# Patient Record
Sex: FEMALE | Race: WHITE
[De-identification: names, ages, dates, MRNs, and addresses within clinical notes are randomized per-mention and may not be internally consistent; named-entity substitution may affect disease eponyms.]

---

## 2017-04-05 ENCOUNTER — HOSPITAL ENCOUNTER (OUTPATIENT)
Dept: HOSPITAL 53 - M WHC | Age: 82
End: 2017-04-05
Attending: INTERNAL MEDICINE
Payer: MEDICARE
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
## 2017-04-05 DIAGNOSIS — S22.000D: ICD-10-CM
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
## 2017-04-05 DIAGNOSIS — Z12.31: Primary | ICD-10-CM

## 2017-04-05 DIAGNOSIS — Z78.0: ICD-10-CM

## 2017-04-05 PROCEDURE — 77080 DXA BONE DENSITY AXIAL: CPT

## 2017-04-05 NOTE — REPMRS
Patient History

The patient states she had a clinical breast exam in 12/16

Patient is postmenopausal.

Family history of breast cancer in maternal aunt at age 50 or 

over.

 

Digital Woman Screen Mammo: April 5, 2017 - Exam #: 

[id number]

Bilateral CC and MLO view(s) were taken.

 

Technologist: Sarah Feng, Technologist

Prior study comparison: Raya 10, 2015, digital woman screen mammo

performed at [facility].  May 27, 2014, left 

breast digital mammo diagnostic unilateral, performed at 

[facility].  May 20, 2014, digital woman screen 

mammo performed at [facility].  May 17, 2013, 

digital woman screen mammo performed at [facility].

 

FINDINGS: There are scattered fibroglandular densities.  There 

has been no change in the appearance of the mammogram from the 

prior studies.  There is a mild amount of scattered 

fibroglandular density which is fairly symmetric. There is no 

interval development of dominant mass, architectural distortion, 

or clustered microcalcification suggestive of malignancy.

 

ASSESSMENT: BI-RADS/ACR category 1 mammogram. Negative.

 

Recommendation

Routine screening mammogram in 1 year (for women over age 40).

This mammogram was interpreted with the aid of an FDA-approved 

computer-aided dectection system.

 

Electronically Signed By: Carrington Guardado MD 04/05/17 2020

## 2017-04-07 NOTE — DEXA
AP SPINE   L1 - L4   1.203    0.1      0.8

LT FEMUR   TOTAL   0.935   -0.6      0.8

RT FEMUR   TOTAL   0.902   -0.8      0.5

TOTAL BODY   TOTAL

OTHER



DUAL FEMUR FRAX* ASSESSMENT

Risk factors:      Alcohol (3 or more units per day). History of fracture (adult
). Secondary osteoporosis (premature menopause).

10 year probability of fracture

Major osteoporotic fracture            22.8 %

Hip fracture                 6.9 %



COMMENTS:

Normal bone densitometry of the spine.

Normal bone densitometry of the right hip.

There is low bone density of the left hip.

Lumbar scoliosis.

The density of the spine has increased 14.5% since the initial exam on 08/28/
2005.

The spine density has increased 12.4% since the most recent exam on 04/27/2011.

The density of the left hip has increased 6.4% since initial exam on 08/28/2005.

The density of the left hip has increased 4.4% since the most recent exam on 04/
27/2011.

The density of the right hip has increased 0.8% since the initial exam on 08/28/
2005.

The density of the right hip has decreased 0.3% since the most recent exam on 04
/22/2011.



FOLLOW-UP:

Recommendation for the next bone density exam: 2 years.
MARGARITA

## 2017-05-08 ENCOUNTER — HOSPITAL ENCOUNTER (OUTPATIENT)
Dept: HOSPITAL 53 - M OPP | Age: 82
Discharge: HOME | End: 2017-05-08
Attending: INTERNAL MEDICINE
Payer: MEDICARE

## 2017-05-08 VITALS — HEIGHT: 66 IN | BODY MASS INDEX: 35.36 KG/M2 | WEIGHT: 220 LBS

## 2017-05-08 VITALS — SYSTOLIC BLOOD PRESSURE: 151 MMHG | DIASTOLIC BLOOD PRESSURE: 80 MMHG

## 2017-05-08 DIAGNOSIS — K57.30: ICD-10-CM

## 2017-05-08 DIAGNOSIS — Z12.11: Primary | ICD-10-CM

## 2017-05-08 DIAGNOSIS — Z79.82: ICD-10-CM

## 2017-05-08 DIAGNOSIS — Z91.013: ICD-10-CM

## 2017-05-08 DIAGNOSIS — Z88.0: ICD-10-CM

## 2017-05-08 DIAGNOSIS — K21.9: ICD-10-CM

## 2017-05-08 DIAGNOSIS — Z88.5: ICD-10-CM

## 2017-05-08 DIAGNOSIS — M19.90: ICD-10-CM

## 2017-05-08 DIAGNOSIS — I10: ICD-10-CM

## 2017-05-08 DIAGNOSIS — M41.9: ICD-10-CM

## 2017-05-08 DIAGNOSIS — E78.00: ICD-10-CM

## 2017-05-08 DIAGNOSIS — Z88.2: ICD-10-CM

## 2017-05-08 DIAGNOSIS — K64.8: ICD-10-CM

## 2017-05-08 DIAGNOSIS — M48.00: ICD-10-CM

## 2017-05-08 DIAGNOSIS — K64.4: ICD-10-CM

## 2017-05-08 DIAGNOSIS — Z88.1: ICD-10-CM

## 2017-05-08 DIAGNOSIS — Z86.010: ICD-10-CM

## 2017-05-08 DIAGNOSIS — Z79.899: ICD-10-CM

## 2017-05-08 DIAGNOSIS — K63.5: ICD-10-CM

## 2017-05-08 DIAGNOSIS — R06.83: ICD-10-CM

## 2017-05-08 DIAGNOSIS — Z88.8: ICD-10-CM

## 2017-06-09 ENCOUNTER — HOSPITAL ENCOUNTER (OUTPATIENT)
Dept: HOSPITAL 53 - M SFHCPLAZ | Age: 82
End: 2017-06-09
Attending: INTERNAL MEDICINE
Payer: MEDICARE

## 2017-06-09 DIAGNOSIS — I10: Primary | ICD-10-CM
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
## 2017-06-09 DIAGNOSIS — E78.00: ICD-10-CM
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
## 2017-06-09 DIAGNOSIS — S22.000D: ICD-10-CM
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
## 2017-06-09 LAB
ALBUMIN SERPL BCG-MCNC: 3.6 GM/DL (ref 3.2–5.2)
ALBUMIN/GLOB SERPL: 1.06 {RATIO} (ref 1–1.93)
ALP SERPL-CCNC: 64 U/L (ref 45–117)
ALT SERPL W P-5'-P-CCNC: 33 U/L (ref 12–78)
ANION GAP SERPL CALC-SCNC: 7 MEQ/L (ref 8–16)
AST SERPL-CCNC: 24 U/L (ref 15–37)
BILIRUB SERPL-MCNC: 0.9 MG/DL (ref 0.2–1)
BUN SERPL-MCNC: 19 MG/DL (ref 7–18)
CALCIUM SERPL-MCNC: 9.5 MG/DL (ref 8.8–10.2)
CHLORIDE SERPL-SCNC: 105 MEQ/L (ref 98–107)
CHOLEST SERPL-MCNC: 221 MG/DL (ref ?–200)
CO2 SERPL-SCNC: 29 MEQ/L (ref 21–32)
CREAT SERPL-MCNC: 1.01 MG/DL (ref 0.55–1.02)
GFR SERPL CREATININE-BSD FRML MDRD: 55.7 ML/MIN/{1.73_M2} (ref 32–?)
GLUCOSE SERPL-MCNC: 108 MG/DL (ref 83–110)
MAGNESIUM SERPL-MCNC: 2 MG/DL (ref 1.8–2.4)
POTASSIUM SERPL-SCNC: 3.6 MEQ/L (ref 3.5–5.1)
PROT SERPL-MCNC: 7 GM/DL (ref 6.4–8.2)
SODIUM SERPL-SCNC: 141 MEQ/L (ref 136–145)
TRIGL SERPL-MCNC: 263 MG/DL (ref ?–150)

## 2017-12-11 ENCOUNTER — HOSPITAL ENCOUNTER (OUTPATIENT)
Dept: HOSPITAL 53 - M SFHCPLAZ | Age: 82
End: 2017-12-11
Attending: INTERNAL MEDICINE
Payer: MEDICARE

## 2017-12-11 DIAGNOSIS — E78.00: Primary | ICD-10-CM

## 2017-12-11 DIAGNOSIS — I10: ICD-10-CM

## 2017-12-11 DIAGNOSIS — K21.9: ICD-10-CM

## 2017-12-11 LAB
ALBUMIN SERPL BCG-MCNC: 3.8 GM/DL (ref 3.2–5.2)
ALBUMIN/GLOB SERPL: 1.12 {RATIO} (ref 1–1.93)
ALP SERPL-CCNC: 74 U/L (ref 45–117)
ALT SERPL W P-5'-P-CCNC: 27 U/L (ref 12–78)
ANION GAP SERPL CALC-SCNC: 9 MEQ/L (ref 8–16)
AST SERPL-CCNC: 23 U/L (ref 7–37)
BILIRUB SERPL-MCNC: 1 MG/DL (ref 0.2–1)
BUN SERPL-MCNC: 15 MG/DL (ref 7–18)
CALCIUM SERPL-MCNC: 9.4 MG/DL (ref 8.8–10.2)
CHLORIDE SERPL-SCNC: 105 MEQ/L (ref 98–107)
CHOLEST SERPL-MCNC: 198 MG/DL (ref ?–200)
CO2 SERPL-SCNC: 28 MEQ/L (ref 21–32)
CREAT SERPL-MCNC: 0.85 MG/DL (ref 0.55–1.02)
ERYTHROCYTE [DISTWIDTH] IN BLOOD BY AUTOMATED COUNT: 13 % (ref 11.5–14.5)
GFR SERPL CREATININE-BSD FRML MDRD: > 60 ML/MIN/{1.73_M2} (ref 32–?)
GLUCOSE SERPL-MCNC: 109 MG/DL (ref 83–110)
MAGNESIUM SERPL-MCNC: 2.2 MG/DL (ref 1.8–2.4)
MCH RBC QN AUTO: 32.5 PG (ref 27–33)
MCHC RBC AUTO-ENTMCNC: 33.4 G/DL (ref 32–36.5)
MCV RBC AUTO: 97.2 FL (ref 80–96)
NRBC BLD AUTO-RTO: 0 % (ref 0–0)
PLATELET # BLD AUTO: 241 10^3/UL (ref 150–450)
POTASSIUM SERPL-SCNC: 3.7 MEQ/L (ref 3.5–5.1)
PROT SERPL-MCNC: 7.2 GM/DL (ref 6.4–8.2)
SODIUM SERPL-SCNC: 142 MEQ/L (ref 136–145)
TRIGL SERPL-MCNC: 270 MG/DL (ref ?–150)
WBC # BLD AUTO: 9.1 10^3/UL (ref 4–10)

## 2018-01-14 ENCOUNTER — HOSPITAL ENCOUNTER (EMERGENCY)
Dept: HOSPITAL 53 - M ED | Age: 83
Discharge: HOME | End: 2018-01-14
Payer: MEDICARE

## 2018-01-14 DIAGNOSIS — Z79.82: ICD-10-CM

## 2018-01-14 DIAGNOSIS — R07.89: Primary | ICD-10-CM

## 2018-01-14 DIAGNOSIS — Z88.5: ICD-10-CM

## 2018-01-14 DIAGNOSIS — Z91.013: ICD-10-CM

## 2018-01-14 DIAGNOSIS — Z88.2: ICD-10-CM

## 2018-01-14 DIAGNOSIS — Z88.1: ICD-10-CM

## 2018-01-14 DIAGNOSIS — Z98.890: ICD-10-CM

## 2018-01-14 DIAGNOSIS — Z79.899: ICD-10-CM

## 2018-01-14 DIAGNOSIS — R06.89: ICD-10-CM

## 2018-01-14 DIAGNOSIS — I10: ICD-10-CM

## 2018-01-14 DIAGNOSIS — K21.9: ICD-10-CM

## 2018-01-14 DIAGNOSIS — Z88.0: ICD-10-CM

## 2018-01-14 DIAGNOSIS — Z88.8: ICD-10-CM

## 2018-01-14 DIAGNOSIS — E78.5: ICD-10-CM

## 2018-01-14 LAB
ANION GAP: 8 MEQ/L (ref 8–16)
BASO #: 0.1 10^3/UL (ref 0–0.2)
BASO %: 0.6 % (ref 0–1)
BLOOD UREA NITROGEN: 18 MG/DL (ref 7–18)
CALCIUM LEVEL: 9.1 MG/DL (ref 8.8–10.2)
CARBON DIOXIDE LEVEL: 27 MEQ/L (ref 21–32)
CHLORIDE LEVEL: 107 MEQ/L (ref 98–107)
CK MB CFR.DF SERPL CALC: 2.42
CK MB CFR.DF SERPL CALC: 2.44
CK SERPL-CCNC: 107 U/L (ref 26–192)
CK SERPL-CCNC: 98 U/L (ref 26–192)
CK-MB VALUE MASS: 2.4 NG/ML (ref 0–3.6)
CK-MB VALUE MASS: 2.6 NG/ML (ref 0–3.6)
CREATININE FOR GFR: 1.13 MG/DL (ref 0.55–1.02)
EOS #: 0.1 10^3/UL (ref 0–0.5)
EOSINOPHIL NFR BLD AUTO: 1 % (ref 0–3)
GFR SERPL CREATININE-BSD FRML MDRD: 48.8 ML/MIN/{1.73_M2} (ref 32–?)
GLUCOSE, FASTING: 111 MG/DL (ref 83–110)
HEMATOCRIT: 41.7 % (ref 36–47)
HEMOGLOBIN: 14.2 G/DL (ref 12–16)
IMMATURE GRANULOCYTE #: 0 10^3/UL (ref 0–0)
IMMATURE GRANULOCYTE %: 0.4 % (ref 0–0)
LYMPH #: 2.8 10^3/UL (ref 1.5–4.5)
LYMPH %: 28.4 % (ref 24–44)
MEAN CORPUSCULAR HEMOGLOBIN: 32.7 PG (ref 27–33)
MEAN CORPUSCULAR HGB CONC: 34.1 G/DL (ref 32–36.5)
MEAN CORPUSCULAR VOLUME: 96.1 FL (ref 80–96)
MONO #: 0.8 10^3/UL (ref 0–0.8)
MONO %: 7.8 % (ref 0–5)
NEUTROPHILS #: 6.1 10^3/UL (ref 1.8–7.7)
NEUTROPHILS %: 61.8 % (ref 36–66)
NRBC BLD AUTO-RTO: 0 % (ref 0–0)
NT-PRO BNP: 175 PG/ML (ref ?–450)
PLATELET COUNT, AUTOMATED: 245 10^3/UL (ref 150–450)
POTASSIUM SERUM: 3.5 MEQ/L (ref 3.5–5.1)
RED BLOOD COUNT: 4.34 10^6/UL (ref 4–5.4)
RED CELL DISTRIBUTION WIDTH: 13.1 % (ref 11.5–14.5)
SODIUM LEVEL: 142 MEQ/L (ref 136–145)
TROPONIN I: < 0.02 NG/ML (ref ?–0.1)
TROPONIN I: < 0.02 NG/ML (ref ?–0.1)
WHITE BLOOD COUNT: 9.8 10^3/UL (ref 4–10)

## 2018-01-14 PROCEDURE — 71045 X-RAY EXAM CHEST 1 VIEW: CPT

## 2018-01-14 RX ADMIN — ASPIRIN 81 MG CHEWABLE TABLET 1 MG: 81 TABLET CHEWABLE at 11:52

## 2018-05-21 ENCOUNTER — HOSPITAL ENCOUNTER (OUTPATIENT)
Dept: HOSPITAL 53 - M SFHCPLAZ | Age: 83
End: 2018-05-21
Attending: NURSE PRACTITIONER
Payer: MEDICARE

## 2018-05-21 DIAGNOSIS — I10: ICD-10-CM

## 2018-05-21 DIAGNOSIS — Z79.899: ICD-10-CM

## 2018-05-21 DIAGNOSIS — M19.90: Primary | ICD-10-CM

## 2018-05-21 DIAGNOSIS — E55.9: ICD-10-CM

## 2018-05-21 LAB
25(OH)D3 SERPL-MCNC: 28 NG/ML (ref 30–100)
CRP SERPL-MCNC: 1.95 MG/DL (ref 0–0.3)
ERYTHROCYTE SEDIMENTATION RATE: 63 MM/HR (ref 0–30)
MAGNESIUM LEVEL: 1.9 MG/DL (ref 1.8–2.4)
RHEUMATOID FACTOR QUANT: < 10 IU/ML (ref ?–15)

## 2018-05-21 PROCEDURE — 83735 ASSAY OF MAGNESIUM: CPT

## 2018-05-23 LAB
B BURGDOR IGG+IGM SER-ACNC: <0.91 ISR (ref 0–0.9)
B BURGDOR IGM SER IA-ACNC: <0.8 INDEX (ref 0–0.79)
CCP IGG SERPL-ACNC: 2 UNITS (ref 0–19)

## 2018-06-06 ENCOUNTER — HOSPITAL ENCOUNTER (OUTPATIENT)
Dept: HOSPITAL 53 - M SFHCPLAZ | Age: 83
End: 2018-06-06
Attending: INTERNAL MEDICINE
Payer: MEDICARE

## 2018-06-06 DIAGNOSIS — E78.00: ICD-10-CM

## 2018-06-06 DIAGNOSIS — I10: Primary | ICD-10-CM

## 2018-06-06 LAB
ALBUMIN/GLOBULIN RATIO: 1.21 (ref 1–1.93)
ALBUMIN: 3.5 GM/DL (ref 3.2–5.2)
ALKALINE PHOSPHATASE: 61 U/L (ref 45–117)
ALT SERPL W P-5'-P-CCNC: 33 U/L (ref 12–78)
ANION GAP: 8 MEQ/L (ref 8–16)
AST SERPL-CCNC: 20 U/L (ref 7–37)
BILIRUBIN,TOTAL: 0.7 MG/DL (ref 0.2–1)
BLOOD UREA NITROGEN: 17 MG/DL (ref 7–18)
CALCIUM LEVEL: 8.7 MG/DL (ref 8.8–10.2)
CARBON DIOXIDE LEVEL: 29 MEQ/L (ref 21–32)
CHLORIDE LEVEL: 108 MEQ/L (ref 98–107)
CHOLEST SERPL-MCNC: 167 MG/DL (ref ?–200)
CHOLESTEROL RISK RATIO: 3.48 (ref ?–5)
CREATININE FOR GFR: 0.99 MG/DL (ref 0.55–1.3)
GFR SERPL CREATININE-BSD FRML MDRD: 56.9 ML/MIN/{1.73_M2} (ref 32–?)
GLUCOSE, FASTING: 124 MG/DL (ref 70–100)
HDLC SERPL-MCNC: 48 MG/DL (ref 40–?)
LDL CHOLESTEROL: 80 MG/DL (ref ?–100)
NONHDLC SERPL-MCNC: 119 MG/DL
POTASSIUM SERUM: 3.3 MEQ/L (ref 3.5–5.1)
SODIUM LEVEL: 145 MEQ/L (ref 136–145)
TOTAL PROTEIN: 6.4 GM/DL (ref 6.4–8.2)
TRIGLYCERIDES LEVEL: 195 MG/DL (ref ?–150)

## 2018-06-06 PROCEDURE — 80053 COMPREHEN METABOLIC PANEL: CPT

## 2018-06-13 ENCOUNTER — HOSPITAL ENCOUNTER (OUTPATIENT)
Dept: HOSPITAL 53 - M SFHCPLAZ | Age: 83
End: 2018-06-13
Attending: INTERNAL MEDICINE
Payer: MEDICARE

## 2018-06-13 DIAGNOSIS — I10: Primary | ICD-10-CM

## 2018-06-13 DIAGNOSIS — M19.90: ICD-10-CM

## 2018-06-13 LAB
CRP SERPL-MCNC: 0.73 MG/DL (ref 0–0.3)
ERYTHROCYTE SEDIMENTATION RATE: 46 MM/HR (ref 0–30)
POTASSIUM SERUM: 3.3 MEQ/L (ref 3.5–5.1)
URIC ACID: 5.9 MG/DL (ref 2.6–6)

## 2018-06-13 PROCEDURE — 84132 ASSAY OF SERUM POTASSIUM: CPT

## 2018-07-05 ENCOUNTER — HOSPITAL ENCOUNTER (OUTPATIENT)
Dept: HOSPITAL 53 - M SFHCPLAZ | Age: 83
End: 2018-07-05
Attending: PHYSICIAN ASSISTANT
Payer: MEDICARE

## 2018-07-05 DIAGNOSIS — M19.90: Primary | ICD-10-CM

## 2018-07-05 DIAGNOSIS — E87.6: ICD-10-CM

## 2018-07-05 LAB
ANION GAP: 10 MEQ/L (ref 8–16)
BLOOD UREA NITROGEN: 18 MG/DL (ref 7–18)
CALCIUM LEVEL: 9.9 MG/DL (ref 8.8–10.2)
CARBON DIOXIDE LEVEL: 27 MEQ/L (ref 21–32)
CHLORIDE LEVEL: 106 MEQ/L (ref 98–107)
CREATININE FOR GFR: 0.87 MG/DL (ref 0.55–1.3)
CRP SERPL-MCNC: 2.1 MG/DL (ref 0–0.3)
ERYTHROCYTE SEDIMENTATION RATE: 61 MM/HR (ref 0–30)
GFR SERPL CREATININE-BSD FRML MDRD: > 60 ML/MIN/{1.73_M2} (ref 32–?)
GLUCOSE, FASTING: 110 MG/DL (ref 70–100)
POTASSIUM SERUM: 4.2 MEQ/L (ref 3.5–5.1)
SODIUM LEVEL: 143 MEQ/L (ref 136–145)

## 2018-07-05 PROCEDURE — 80048 BASIC METABOLIC PNL TOTAL CA: CPT

## 2018-07-06 ENCOUNTER — HOSPITAL ENCOUNTER (OUTPATIENT)
Dept: HOSPITAL 53 - M WUC | Age: 83
End: 2018-07-06
Attending: PHYSICIAN ASSISTANT
Payer: MEDICARE

## 2018-07-06 DIAGNOSIS — M18.12: Primary | ICD-10-CM

## 2018-07-06 DIAGNOSIS — M25.532: ICD-10-CM

## 2018-07-06 DIAGNOSIS — M25.432: ICD-10-CM

## 2018-07-06 PROCEDURE — 73110 X-RAY EXAM OF WRIST: CPT

## 2018-07-21 ENCOUNTER — HOSPITAL ENCOUNTER (OUTPATIENT)
Dept: HOSPITAL 53 - M LAB REF | Age: 83
End: 2018-07-21
Attending: PHYSICIAN ASSISTANT
Payer: MEDICARE

## 2018-07-21 DIAGNOSIS — R30.0: Primary | ICD-10-CM

## 2018-07-21 PROCEDURE — 87086 URINE CULTURE/COLONY COUNT: CPT

## 2018-09-01 ENCOUNTER — HOSPITAL ENCOUNTER (EMERGENCY)
Dept: HOSPITAL 53 - M ED | Age: 83
Discharge: HOME | End: 2018-09-01
Payer: MEDICARE

## 2018-09-01 DIAGNOSIS — Z79.899: ICD-10-CM

## 2018-09-01 DIAGNOSIS — Z88.8: ICD-10-CM

## 2018-09-01 DIAGNOSIS — I44.0: ICD-10-CM

## 2018-09-01 DIAGNOSIS — Z88.5: ICD-10-CM

## 2018-09-01 DIAGNOSIS — M54.16: Primary | ICD-10-CM

## 2018-09-01 DIAGNOSIS — Z79.82: ICD-10-CM

## 2018-09-01 DIAGNOSIS — Z91.013: ICD-10-CM

## 2018-09-01 DIAGNOSIS — Z88.2: ICD-10-CM

## 2018-09-01 DIAGNOSIS — M85.88: ICD-10-CM

## 2018-09-01 DIAGNOSIS — M16.11: ICD-10-CM

## 2018-09-01 DIAGNOSIS — Z88.0: ICD-10-CM

## 2018-09-01 RX ADMIN — ONDANSETRON 1 MG: 4 TABLET, ORALLY DISINTEGRATING ORAL at 11:23

## 2018-12-03 ENCOUNTER — HOSPITAL ENCOUNTER (OUTPATIENT)
Dept: HOSPITAL 53 - M SFHCPLAZ | Age: 83
End: 2018-12-03
Attending: INTERNAL MEDICINE
Payer: MEDICARE

## 2018-12-03 DIAGNOSIS — E66.01: ICD-10-CM

## 2018-12-03 DIAGNOSIS — M19.90: Primary | ICD-10-CM

## 2018-12-03 LAB
ALBUMIN/GLOBULIN RATIO: 1.09 (ref 1–1.93)
ALBUMIN: 3.5 GM/DL (ref 3.2–5.2)
ALKALINE PHOSPHATASE: 65 U/L (ref 45–117)
ALT SERPL W P-5'-P-CCNC: 28 U/L (ref 12–78)
ANION GAP: 10 MEQ/L (ref 8–16)
AST SERPL-CCNC: 24 U/L (ref 7–37)
BASO #: 0 10^3/UL (ref 0–0.2)
BASO %: 0.7 % (ref 0–1)
BILIRUBIN,TOTAL: 0.6 MG/DL (ref 0.2–1)
BLOOD UREA NITROGEN: 16 MG/DL (ref 7–18)
CALCIUM LEVEL: 9.1 MG/DL (ref 8.8–10.2)
CARBON DIOXIDE LEVEL: 26 MEQ/L (ref 21–32)
CHLORIDE LEVEL: 108 MEQ/L (ref 98–107)
CREATININE FOR GFR: 0.94 MG/DL (ref 0.55–1.3)
CRP SERPL-MCNC: < 0.3 MG/DL (ref 0–0.3)
EOS #: 0.1 10^3/UL (ref 0–0.5)
EOSINOPHIL NFR BLD AUTO: 2.1 % (ref 0–3)
ERYTHROCYTE SEDIMENTATION RATE: 29 MM/HR (ref 0–42)
GFR SERPL CREATININE-BSD FRML MDRD: > 60 ML/MIN/{1.73_M2} (ref 32–?)
GLUCOSE, FASTING: 97 MG/DL (ref 70–100)
HEMATOCRIT: 40.3 % (ref 36–47)
HEMOGLOBIN: 13.3 G/DL (ref 12–15.5)
IMMATURE GRANULOCYTE %: 0.3 % (ref 0–3)
LYMPH #: 1.9 10^3/UL (ref 1.5–4.5)
LYMPH %: 33.2 % (ref 24–44)
MEAN CORPUSCULAR HEMOGLOBIN: 32.6 PG (ref 27–33)
MEAN CORPUSCULAR HGB CONC: 33 G/DL (ref 32–36.5)
MEAN CORPUSCULAR VOLUME: 98.8 FL (ref 80–96)
MONO #: 0.5 10^3/UL (ref 0–0.8)
MONO %: 8.8 % (ref 0–5)
NEUTROPHILS #: 3.2 10^3/UL (ref 1.8–7.7)
NEUTROPHILS %: 54.9 % (ref 36–66)
NRBC BLD AUTO-RTO: 0 % (ref 0–0)
PLATELET COUNT, AUTOMATED: 210 10^3/UL (ref 150–450)
POTASSIUM SERUM: 4.1 MEQ/L (ref 3.5–5.1)
RED BLOOD COUNT: 4.08 10^6/UL (ref 4–5.4)
RED CELL DISTRIBUTION WIDTH: 13.2 % (ref 11.5–14.5)
RHEUMATOID FACTOR QUANT: < 10 IU/ML (ref ?–15)
SODIUM LEVEL: 144 MEQ/L (ref 136–145)
TOTAL PROTEIN: 6.7 GM/DL (ref 6.4–8.2)
WHITE BLOOD COUNT: 5.8 10^3/UL (ref 4–10)

## 2018-12-03 PROCEDURE — 80053 COMPREHEN METABOLIC PANEL: CPT

## 2018-12-11 LAB — CCP IGG SERPL-ACNC: 4 UNITS (ref 0–19)

## 2018-12-18 ENCOUNTER — HOSPITAL ENCOUNTER (OUTPATIENT)
Dept: HOSPITAL 53 - M RAD | Age: 83
End: 2018-12-18
Attending: INTERNAL MEDICINE
Payer: MEDICARE

## 2018-12-18 DIAGNOSIS — M19.031: ICD-10-CM

## 2018-12-18 DIAGNOSIS — M85.861: ICD-10-CM

## 2018-12-18 DIAGNOSIS — M18.0: Primary | ICD-10-CM

## 2018-12-18 DIAGNOSIS — M41.86: ICD-10-CM

## 2018-12-18 DIAGNOSIS — M25.78: ICD-10-CM

## 2018-12-18 DIAGNOSIS — M17.0: ICD-10-CM

## 2018-12-18 DIAGNOSIS — M25.761: ICD-10-CM

## 2018-12-18 DIAGNOSIS — M47.818: ICD-10-CM

## 2018-12-18 DIAGNOSIS — M25.762: ICD-10-CM

## 2018-12-18 DIAGNOSIS — M85.862: ICD-10-CM

## 2018-12-18 DIAGNOSIS — M85.821: ICD-10-CM

## 2018-12-18 DIAGNOSIS — M81.0: ICD-10-CM

## 2018-12-18 DIAGNOSIS — M85.841: ICD-10-CM

## 2018-12-18 NOTE — REP
SI joint series:  Four views.

 

History:  Arthritis.

 

Findings:  There is mild osteoarthritic spurring of the SI joints.  There is no

evidence of erosive change or ankylosis.  There are degenerative spondylosis

changes in the lumbar spine along with a levoconvex scoliotic curvature.

Vascular calcification is noted.

 

Impression:

 

Osteoarthritic spurring of the SI joints.  No erosive or ankylosis changes.

 

 

Electronically Signed by

Nii Guardado MD 12/18/2018 11:44 A

## 2018-12-18 NOTE — REP
STANDING AP VIEW OF BOTH KNEES:

 

History:  Arthritis.

 

Comparison right knee radiographs are from January 22, 2008.

 

Findings:  There is advanced osteoarthritis of the right knee with extensive

narrowing of the medial joint space due to cartilage loss.  There is some

sclerosis and spurring particularly medially.  Joint space loss and

osteoarthritic spurring is more pronounced than on the 2008 prior study of the

right knee.  There is mild narrowing of the medial compartment on the left.

There is bilateral lateral compartment spurring.  There is diffuse osteopenia.

 

Impression:

 

Osteoarthritis.  Significant joint space narrowing on the right medially.

 

 

Electronically Signed by

Nii Guardado MD 12/18/2018 11:50 A

## 2018-12-18 NOTE — REP
Right wrist series:  Four views.

 

History:  Arthritis.  No comparison right wrist radiographs.

 

Findings:  There is diffuse osteopenia.  Moderate to advanced osteoarthritis is

seen in the first carpometacarpal articulation as described in the hand

radiographs.  No erosive changes seen.

 

Impression:

 

Diffuse osteoporosis and osteoarthritic changes.

 

 

Electronically Signed by

Nii Guardado MD 12/18/2018 09:08 A

## 2018-12-18 NOTE — REP
Bilateral hand series:  Eight views.

 

History:  Arthritis.

 

Findings:  Four views of the right hand demonstrate diffuse osteopenia.  There

are osteoarthritic changes affecting the first carpometacarpal articulation, the

first and second MCP joint, the IP joint of the thumb and all of the DIP joints.

There are erosive osteoarthritic changes in the DIP joints of the index, ring,

and small finger.

 

Four views of the left hand show osteoporosis.  There are advanced osteoarthritic

changes distributed similarly.  Erosive osteoarthritic changes are seen involving

the DIP joints of all of the fingers on the left hand.  There is severe

osteoarthritis at the carpometacarpal joint of the thumb on the left.  This joint

shows some subluxation.

 

Impression:

 

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  Advanced osteoarthritis of

the first carpometacarpal articulation, left more so than right.

 

 

Electronically Signed by

Nii Guardado MD 12/18/2018 11:44 A

## 2018-12-20 ENCOUNTER — HOSPITAL ENCOUNTER (OUTPATIENT)
Dept: HOSPITAL 53 - M SFHCPLAZ | Age: 83
End: 2018-12-20
Attending: INTERNAL MEDICINE
Payer: MEDICARE

## 2018-12-20 DIAGNOSIS — E78.00: ICD-10-CM

## 2018-12-20 DIAGNOSIS — M19.90: ICD-10-CM

## 2018-12-20 DIAGNOSIS — Z79.899: Primary | ICD-10-CM

## 2018-12-20 DIAGNOSIS — I10: ICD-10-CM

## 2018-12-20 LAB
ALBUMIN SERPL BCG-MCNC: 3.5 GM/DL (ref 3.2–5.2)
ALT SERPL W P-5'-P-CCNC: 28 U/L (ref 12–78)
BILIRUB SERPL-MCNC: 0.6 MG/DL (ref 0.2–1)
BUN SERPL-MCNC: 13 MG/DL (ref 7–18)
CALCIUM SERPL-MCNC: 8.8 MG/DL (ref 8.8–10.2)
CHLORIDE SERPL-SCNC: 106 MEQ/L (ref 98–107)
CHOLEST SERPL-MCNC: 169 MG/DL (ref ?–200)
CHOLEST/HDLC SERPL: 2.73 {RATIO} (ref ?–5)
CO2 SERPL-SCNC: 28 MEQ/L (ref 21–32)
CREAT SERPL-MCNC: 0.92 MG/DL (ref 0.55–1.3)
CRP SERPL-MCNC: < 0.3 MG/DL (ref 0–0.3)
ERYTHROCYTE [SEDIMENTATION RATE] IN BLOOD BY WESTERGREN METHOD: 18 MM/HR (ref 0–42)
GFR SERPL CREATININE-BSD FRML MDRD: > 60 ML/MIN/{1.73_M2} (ref 32–?)
GLUCOSE SERPL-MCNC: 102 MG/DL (ref 70–100)
HCT VFR BLD AUTO: 38.9 % (ref 36–47)
HDLC SERPL-MCNC: 62 MG/DL (ref 40–?)
HGB BLD-MCNC: 13.1 G/DL (ref 12–15.5)
LDLC SERPL CALC-MCNC: 69 MG/DL (ref ?–100)
MAGNESIUM SERPL-MCNC: 1.9 MG/DL (ref 1.8–2.4)
MCH RBC QN AUTO: 32 PG (ref 27–33)
MCHC RBC AUTO-ENTMCNC: 33.7 G/DL (ref 32–36.5)
MCV RBC AUTO: 95.1 FL (ref 80–96)
NONHDLC SERPL-MCNC: 107 MG/DL
PLATELET # BLD AUTO: 213 10^3/UL (ref 150–450)
POTASSIUM SERPL-SCNC: 3.6 MEQ/L (ref 3.5–5.1)
PROT SERPL-MCNC: 6.5 GM/DL (ref 6.4–8.2)
RBC # BLD AUTO: 4.09 10^6/UL (ref 4–5.4)
SODIUM SERPL-SCNC: 144 MEQ/L (ref 136–145)
TRIGL SERPL-MCNC: 188 MG/DL (ref ?–150)
WBC # BLD AUTO: 5 10^3/UL (ref 4–10)

## 2018-12-22 LAB
B19V IGG SER IA-ACNC: 7.2 INDEX (ref 0–0.8)
B19V IGM SER IA-ACNC: 0.1 INDEX (ref 0–0.8)

## 2019-06-04 ENCOUNTER — HOSPITAL ENCOUNTER (OUTPATIENT)
Dept: HOSPITAL 53 - M WHC | Age: 84
End: 2019-06-04
Attending: INTERNAL MEDICINE
Payer: MEDICARE

## 2019-06-04 DIAGNOSIS — Z12.31: Primary | ICD-10-CM

## 2019-06-04 NOTE — REPMRS
Patient History

The patient states she had a clinical breast exam in  12/2018.

Patient is postmenopausal.

Family history of breast cancer at age 50 or over in maternal 

aunt.

 

3D TOMOSYNTHESIS WAS PERFORMED.

 

Digital Woman Screen Mammo: June 4, 2019 - Exam #: 

[id number]

Bilateral MLO, CC, and XCCL view(s) were taken.

 

Technologist: Sarah Feng, Technologist

Prior study comparison: April 5, 2017, digital woman screen mammo

performed at [facility].  Raya 10, 2015, 

digital woman screen mammo performed at [facility] [employer] [facility] 

[facility].

 

FINDINGS: There are scattered fibroglandular densities.  There 

has been no change in the appearance of the mammogram from the 

prior studies.  There is a mild amount of residual fibroglandular

tissue which is fairly symmetric. There is no interval 

development of dominant mass, architectural distortion, or 

clustered microcalcification suggestive of malignancy.

 

Assessment: BI-RADS/ACR category 1 mammogram. Negative Mammogram.

 

Recommendation

Routine screening mammogram in 1 year (for women over age 40).

This mammogram was interpreted with the aid of an FDA-approved 

computer-aided dectection system.

 

Electronically Signed By: Brayden Pereira MD 06/04/19 0823

## 2019-08-05 ENCOUNTER — HOSPITAL ENCOUNTER (OUTPATIENT)
Dept: HOSPITAL 53 - M SFHCPLAZ | Age: 84
End: 2019-08-05
Attending: INTERNAL MEDICINE
Payer: MEDICARE

## 2019-08-05 DIAGNOSIS — E78.00: ICD-10-CM

## 2019-08-05 DIAGNOSIS — E55.9: ICD-10-CM

## 2019-08-05 DIAGNOSIS — I10: Primary | ICD-10-CM

## 2019-08-05 DIAGNOSIS — Z79.82: ICD-10-CM

## 2019-08-05 LAB
25(OH)D3 SERPL-MCNC: 35.2 NG/ML (ref 30–100)
ALBUMIN SERPL BCG-MCNC: 3.5 GM/DL (ref 3.2–5.2)
ALT SERPL W P-5'-P-CCNC: 25 U/L (ref 12–78)
BILIRUB SERPL-MCNC: 0.8 MG/DL (ref 0.2–1)
BUN SERPL-MCNC: 13 MG/DL (ref 7–18)
CALCIUM SERPL-MCNC: 9.5 MG/DL (ref 8.8–10.2)
CHLORIDE SERPL-SCNC: 108 MEQ/L (ref 98–107)
CHOLEST SERPL-MCNC: 163 MG/DL (ref ?–200)
CHOLEST/HDLC SERPL: 2.4 {RATIO} (ref ?–5)
CO2 SERPL-SCNC: 30 MEQ/L (ref 21–32)
CREAT SERPL-MCNC: 0.93 MG/DL (ref 0.55–1.3)
GFR SERPL CREATININE-BSD FRML MDRD: > 60 ML/MIN/{1.73_M2} (ref 32–?)
GLUCOSE SERPL-MCNC: 103 MG/DL (ref 70–100)
HDLC SERPL-MCNC: 68 MG/DL (ref 40–?)
LDLC SERPL CALC-MCNC: 58 MG/DL (ref ?–100)
MAGNESIUM SERPL-MCNC: 2.1 MG/DL (ref 1.8–2.4)
NONHDLC SERPL-MCNC: 95 MG/DL
POTASSIUM SERPL-SCNC: 3.6 MEQ/L (ref 3.5–5.1)
PROT SERPL-MCNC: 6.5 GM/DL (ref 6.4–8.2)
SODIUM SERPL-SCNC: 144 MEQ/L (ref 136–145)
TRIGL SERPL-MCNC: 184 MG/DL (ref ?–150)

## 2020-01-26 ENCOUNTER — HOSPITAL ENCOUNTER (INPATIENT)
Dept: HOSPITAL 53 - M ED | Age: 85
LOS: 2 days | Discharge: HOME | DRG: 71 | End: 2020-01-28
Attending: INTERNAL MEDICINE | Admitting: INTERNAL MEDICINE
Payer: MEDICARE

## 2020-01-26 VITALS — WEIGHT: 212.53 LBS | HEIGHT: 67 IN | BODY MASS INDEX: 33.36 KG/M2

## 2020-01-26 VITALS — SYSTOLIC BLOOD PRESSURE: 160 MMHG | DIASTOLIC BLOOD PRESSURE: 70 MMHG

## 2020-01-26 VITALS — SYSTOLIC BLOOD PRESSURE: 148 MMHG | DIASTOLIC BLOOD PRESSURE: 79 MMHG

## 2020-01-26 VITALS — SYSTOLIC BLOOD PRESSURE: 180 MMHG | DIASTOLIC BLOOD PRESSURE: 82 MMHG

## 2020-01-26 DIAGNOSIS — K21.9: ICD-10-CM

## 2020-01-26 DIAGNOSIS — Z88.1: ICD-10-CM

## 2020-01-26 DIAGNOSIS — G45.4: Primary | ICD-10-CM

## 2020-01-26 DIAGNOSIS — Z88.0: ICD-10-CM

## 2020-01-26 DIAGNOSIS — F41.9: ICD-10-CM

## 2020-01-26 DIAGNOSIS — Z88.8: ICD-10-CM

## 2020-01-26 DIAGNOSIS — E87.6: ICD-10-CM

## 2020-01-26 DIAGNOSIS — F32.9: ICD-10-CM

## 2020-01-26 DIAGNOSIS — Z91.013: ICD-10-CM

## 2020-01-26 DIAGNOSIS — Z79.82: ICD-10-CM

## 2020-01-26 DIAGNOSIS — Z79.899: ICD-10-CM

## 2020-01-26 DIAGNOSIS — Z88.2: ICD-10-CM

## 2020-01-26 DIAGNOSIS — I10: ICD-10-CM

## 2020-01-26 DIAGNOSIS — Z88.5: ICD-10-CM

## 2020-01-26 DIAGNOSIS — M16.11: ICD-10-CM

## 2020-01-26 DIAGNOSIS — G43.909: ICD-10-CM

## 2020-01-26 DIAGNOSIS — E83.42: ICD-10-CM

## 2020-01-26 DIAGNOSIS — E78.5: ICD-10-CM

## 2020-01-26 DIAGNOSIS — G45.9: ICD-10-CM

## 2020-01-26 LAB
APTT BLD: 28.7 SECONDS (ref 25–38.4)
BASOPHILS # BLD AUTO: 0.1 10^3/UL (ref 0–0.2)
BASOPHILS NFR BLD AUTO: 1.1 % (ref 0–1)
CK MB CFR.DF SERPL CALC: 2.03
CK MB SERPL-MCNC: 3 NG/ML (ref ?–3.6)
CK SERPL-CCNC: 148 U/L (ref 26–192)
EOSINOPHIL # BLD AUTO: 0.3 10^3/UL (ref 0–0.5)
EOSINOPHIL NFR BLD AUTO: 5.6 % (ref 0–3)
HCT VFR BLD AUTO: 42.7 % (ref 36–47)
HGB BLD-MCNC: 13.9 G/DL (ref 12–15.5)
INR PPP: 1.02
LYMPHOCYTES # BLD AUTO: 1.7 10^3/UL (ref 1.5–5)
LYMPHOCYTES NFR BLD AUTO: 32.5 % (ref 24–44)
MCH RBC QN AUTO: 31.3 PG (ref 27–33)
MCHC RBC AUTO-ENTMCNC: 32.6 G/DL (ref 32–36.5)
MCV RBC AUTO: 96.2 FL (ref 80–96)
MONOCYTES # BLD AUTO: 0.6 10^3/UL (ref 0–0.8)
MONOCYTES NFR BLD AUTO: 11.2 % (ref 0–5)
NEUTROPHILS # BLD AUTO: 2.6 10^3/UL (ref 1.5–8.5)
NEUTROPHILS NFR BLD AUTO: 49.2 % (ref 36–66)
PLATELET # BLD AUTO: 201 10^3/UL (ref 150–450)
PROTHROMBIN TIME: 13.1 SECONDS (ref 11.8–14)
RBC # BLD AUTO: 4.44 10^6/UL (ref 4–5.4)
TROPONIN I SERPL-MCNC: < 0.02 NG/ML (ref ?–0.1)
WBC # BLD AUTO: 5.4 10^3/UL (ref 4–10)

## 2020-01-26 RX ADMIN — FLUTICASONE PROPIONATE SCH PUFF: 110 AEROSOL, METERED RESPIRATORY (INHALATION) at 21:00

## 2020-01-26 RX ADMIN — SODIUM CHLORIDE, PRESERVATIVE FREE SCH ML: 5 INJECTION INTRAVENOUS at 21:27

## 2020-01-26 RX ADMIN — SODIUM CHLORIDE SCH UNITS: 4.5 INJECTION, SOLUTION INTRAVENOUS at 21:26

## 2020-01-26 NOTE — HPEPDOC
General


Date of Admission


1/26/20


Date of Service:  Jan 26, 2020


Chief Complaint


The patient is a 86-year-old female admitted with a reason for visit of 

Confused.


Source:  Patient


Exam Limitations:  No limitations


Timing/Duration:  Unsure


Severity:  Other (unsure)


Associated Symptoms:  Other (amnesia)





History of Present Illness


This is 86 years old white female with past medical history of hypertension, 

hyperlipidemia, GERD, depression, anxiety, dyspnea on exertion inflammatory 

arthritis, previous parvovirus infection was with her friend at the movie 

theater today. She developed an episode of confusion. Patient does not remember 

anything until she was brought to the ED and she was in the bed. It is not 

available to get this detailed history, but as per patient's EMS EMR and from 

physician. History was obtained that she got an episode of confusion and 

forgetfulness for quite some time as she got to the emergency room note. Patient

denies chest pain, nausea, vomiting, shortness of breath or any weakness





Home Medications


Scheduled


 (Calcium Magnesium & Zinc 334-134-5 mg) 1 Tab Tab, 1 TAB PO DAILY, (Reported)


 (Tylenol Pm Extra Strength 500-25 mg) 1 Tab Tab, 2 TAB PO QHS, (Reported)


Acetaminophen (Tylenol Arthritis) 650 Mg Tab, 650 MG PO Q8H, (Reported)


Aspirin (Aspirin EC) 81 Mg Tab, 81 MG PO DAILY, (Reported)


Atorvastatin Calcium (Atorvastatin Calcium) 40 Mg Tab, 40 MG PO DAILY, 

(Reported)


Bacillus/Protease/Amylas/Lipas (Digest Adv Intensive Bowel Cap) 1 Cap Cap, 1 CAP

PO DAILY, (Reported)


Coenzyme Q10 (Coenzyme Q-10) 1 Pow Pow, 1 TAB PO DAILY, (Reported)


Hydroxychloroquine Sulfate (Hydroxychloroquine Sulfate) 200 Mg Tab, BID, 

(Reported)


Losartan/Hydrochlorothiazide (Losartan-Hctz 100-12.5 mg Tab) 1 Each Tablet, 1 

TAB PO DAILY, (Reported)


Omega-3 Fatty Acids/Fish Oil (Fish Oil 1,000 mg Capsule) 1,000 Mg Cap, 2 CAP PO 

DAILY, (Reported)


Turmeric/Turmeric Root Extract (Turmeric 450-50 mg Capsule) 500 Mg Cap, 1 TAB PO

DAILY, (Reported)


Vits A,C,E/Lutein/Minerals (Ocuvite with Lutein Tablet) 1 Tab Tab, 1 TAB PO 

DAILY, (Reported)





Scheduled PRN


 (Imodium A-D) 2 Mg Cap, 2 MG PO PRN PRN for DIARRHEA, (Reported)


Ondansetron (Zofran Odt) 4 Mg Tab, 4 MG PO Q4H PRN for NAUSEA


Tramadol HCl (Ultram) 50 Mg Tab, 50 MG PO Q8HP PRN for PAIN





Miscellaneous Medications


Carvedilol (Carvedilol) 12.5 Mg Tab, (Reported)


Fluticasone/Vilanterol (Breo Ellipta 100-25 Mcg INH) 1 Inh Inh, (Reported)


Potassium Chloride (Potassium Chloride) 10 Meq Cap, (Reported)


[Losartan/Hct]  , (Reported)





Allergies


Coded Allergies:  


     Tuna (Unverified  Allergy, Severe, oral swelling, 5/1/17)


     Cephalosporins (Verified  Allergy, Unknown, 1/26/20)


     MS - Cefaclor (Verified  Allergy, Unknown, 5/1/17)


     MS - Cephalosporins (Verified  Allergy, Unknown, 4/9/13)


     MS - Codeine (Verified  Allergy, Unknown, 4/9/13)


     MS - Erythromycin (Unverified  Allergy, Unknown, 5/1/17)


     MS - Penicillins (Verified  Allergy, Unknown, 4/9/13)


     MS - Penicillins Cross Reactors (Verified  Allergy, Unknown, 4/9/13)


     MS - Polyethylene Glycol (Verified  Allergy, Unknown, 1/14/18)


     MS - Sulfa Drugs (Verified  Allergy, Unknown, 4/9/13)


     MS - Sulfa Drugs Cross Reactors (Verified  Allergy, Unknown, 4/9/13)


     MS - Sulfamethoxazole (Verified  Allergy, Unknown, 4/9/13)


     MS - Trimethoprim (Verified  Allergy, Unknown, 4/9/13)


     Penicillins (Verified  Allergy, Unknown, 1/26/20)


     Sulfa (Sulfonamide Antibiotics) (Verified  Allergy, Unknown, 1/26/20)


     cefaclor (Verified  Allergy, Unknown, 1/26/20)


     erythromycin base (Verified  Allergy, Unknown, 1/26/20)


     polyethylene glycol 3350 (Verified  Allergy, Unknown, 1/26/20)


     trimethoprim (Verified  Allergy, Unknown, 1/26/20)





Past Medical History


Medical History


Hypertension, hyperlipidemia, GERD, depression, anxiety, dyspnea on exertion 

inflammatory arthritis, parvovirus infection


Surgical History


Tonsillectomy, appendectomy, transvaginal hysterectomy, bilateral soles 

scheduled for nephrectomy. Her glass of system or ganglion removed from right 

breast sebaceous cyst removal, colonoscopy, left carpal tunnel release, right 

carpal tunnel release, bilateral ptosis repair, colonoscopy with hyperplastic 

polyp removals





Family History


Significant Family History:  No pertinent family hx





Social History


* Smoker:  Denies


Alcohol:  Denies


Drugs:  denies





A-FIB/CHADSVASC


A-FIB History


Current/History of A-Fib/PAF?:  No





Review of Systems


Constitutional:  Denies: Chills, Fever, Malaise, Night Sweats, Weakness, 

Fatigue, Weight Loss, Lethargy, Other


Eyes:  Denies: Pain, Vision change, Conjunctivae inflammation, Eyelid 

inflammation, Redness, Other


ENT:  Denies: Head Aches, Ear Pain, Dysphagia, Sinus Congestion, Post Nasal 

Drip, Sore Throat, Epistaxis, Other Symptoms


Skin:  Denies: Rash, Lesions, Jaundice, Bruising, Itching, Dry, Breakdown, Nail 

Changes, Other


Pulmonary:  Denies: Dyspnea, Cough, Pleuritic Chest Pain, Other Symptoms


Cardiovascular:  Denies: Chest Pain, Palpitations, Orthopnea, Paroxysmal Noc. 

Dyspnea, Edema, Lt Headedness, Other Symptoms


Gastrointestinal:  Denies: Nausea, Vomiting, Abdominal Pain, Diarrhea, 

Constipation, Melena, Hematochezia, Other Symptoms


Genitourinary:  Denies: Dysuria, Frequency, Incontinence, Hematuria, Retention, 

Other Symptoms


Hematologic:  Denies: Bruising, Bleeding Excessively, Petecchia, Purpura, 

Enlarged Lymph Nodes, Other Hematologic


Endocrine:  Denies: Polydipsia, Polyphagia, Polyuria, Heat Intolerance, Cold 

Intolerance, Other Endocrine Sx


Musculoskeletal:  Denies: Neck Pain, Back Pain, Shoulder Pain, Arm Pain, Hand 

Pain, Leg Pain, Foot Pain, Joint Pain, Muscle Pain, Spasms, Other Symptoms


Neurological:  Reports: Other Symptoms (global amnesia)


Psych:  Denies: Mood Normal, Anxiety, Depression, Memory Issues, Thoughts of 

Self Harm, Anger, Thoughts of Harming Other, Other Psych





Physical Examination


General Exam:  Positive: Alert, Cooperative


Eye Exam:  Positive: PERRLA, Conjunctiva & lids normal


ENT Exam:  Positive: Atraumatic, Mucous membr. moist/pink


Neck Exam:  Positive: Supple


Chest Exam:  Positive: Clear to auscultation, Normal air movement


Heart Exam:  Positive: Rate Normal, Normal S1, Normal S2


Abdomen Exam:  Positive: Normal bowel sounds, Soft


Extremity Exam:  Positive: Normal pulses


Skin Exam:  Positive: Nl turgor and temperature


Neuro Exam:  Positive: Normal Gait, Strength at 5/5 X4 ext, Sensation Intact, 

Cranial Nerves 3-12 NL


Psych Exam:  Positive: Mental status NL, Anxiety, Oriented x 3





Vital Signs





Vital Signs








  Date Time  Temp Pulse Resp B/P (MAP) Pulse Ox O2 Delivery O2 Flow Rate FiO2


 


1/26/20 15:30  80 18 187/85 (119) 94 Room Air  


 


1/26/20 14:24 98.3       











Laboratory Data


Labs 24H


Laboratory Tests 2


1/26/20 14:38: Bedside Glucose (Misc Panel) 105


1/26/20 14:41: 


Immature Granulocyte % (Auto) 0.4, Neutrophils (%) (Auto) 49.2, Lymphocytes (%) 

(Auto) 32.5, Monocytes (%) (Auto) 11.2H, Eosinophils (%) (Auto) 5.6H, Basophils 

(%) (Auto) 1.1H, Neutrophils # (Auto) 2.6, Lymphocytes # (Auto) 1.7, Monocytes #

 (Auto) 0.6, Eosinophils # (Auto) 0.3, Basophils # (Auto) 0.1, Nucleated Red 

Blood Cells % (auto) 0.0, Prothrombin Time 13.1, Prothromb Time International 

Ratio 1.02, Activated Partial Thromboplast Time 28.7, Total Creatine Kinase 148,

 Creatine Kinase MB 3.0, Creatine Kinase MB Relative Index 2.03, Troponin I < 

0.02


1/26/20 14:42: 


POC Glucose (Misc Panel) 105, POC Sodium (Misc Panel) 142, POC Potassium (Misc 

Panel) 3.3L, POC Chloride (Misc Panel) 105, POC Total CO2 (Misc Panel) 27.0, POC

 Blood Urea Nitrogen (Misc Panel 16, POC Ionized Calcium (Misc Panel) 4.7, POC 

Creatinine (Misc Panel) 0.9, POC Hematocrit (Misc Panel) 41.0


1/26/20 15:12: 


Bedside Prothrombin Time INR 1.1, Prothrombin Time (MISC) 13.6


CBC/BMP


Laboratory Tests


1/26/20 14:41











Problems





(1) Amnesia, global, transient


Status:  Acute


Problem Text:  86. His old white female with past medical history of 

hypertension, hyperlipidemia on aspirin and Lipitor already developed a episode 

of global amnesia and confusion, most likely TIA


Dr. Leblanc was called from ED and he is aware about the consultation. Dr. Leblanc 

recommended to get MRI increase patient's aspirin and continue Lipitor.


Admit patient to PCU with telemetry monitor


Saline lock


Aspirin 325 mg by mouth daily


, Lipitor 40 mg by mouth daily


Continue all home meds


MRI of the brain


Echocardiogram


Neurology consultation with Dr. Leblanc


PT, OT eval


Heparin for DVT prophylaxis


Diet: low-salt


Activity as tolerated





(2) TIA (transient ischemic attack)


Status:  Acute


Problem Text:  As above





(3) Osteoarthritis of right hip


Status:  Chronic


Problem Text:  Continue home meds





(4) Essential hypertension


Status:  Chronic


Problem Text:  Continue home meds





(5) Hypokalemia


Status:  Acute


Problem Text:  Patient's potassium is 3.3, but she is on diuretic at home


Add 40 mEq KCl by mouth 1 dose


Repeat levels in a.m.








Plan / VTE


VTE Prophylaxis Ordered?:  Yes











TIFFANY GASPAR MD              Jan 26, 2020 16:42

## 2020-01-26 NOTE — ECGEPIP
[facility] - ED

                                       

                                       Test Date:    2020

Pat Name:     SAUL WU         Department:   

Patient ID:   [id number]                 Room:         -

Gender:       Female                   Technician:   

:          [id number]               Requested By: Maja Lundborg-Gray 

Order Number: [id number]     Reading MD:   Maja Lundborg-Gray

                                 Measurements

Intervals                              Axis          

Rate:         85                       P:            60

MN:           246                      QRS:          -33

QRSD:         109                      T:            20

QT:           402                                    

QTc:          479                                    

                           Interpretive Statements

SINUS RHYTHM WITH FIRST DEGREE AV BLOCK

MARKED LEFT AXIS DEVIATION

NONSPECIFIC ST T WAVE CHANGES

PROLONGED QTC 

 

CW 18 RATE INCREASED

NONSPECIFIC ST T WAVE CHANGES

Electronically Signed on 2020 19:33:20 EST by Maja Lundborg-Gray

## 2020-01-27 VITALS — SYSTOLIC BLOOD PRESSURE: 163 MMHG | DIASTOLIC BLOOD PRESSURE: 70 MMHG

## 2020-01-27 VITALS — DIASTOLIC BLOOD PRESSURE: 63 MMHG | SYSTOLIC BLOOD PRESSURE: 141 MMHG

## 2020-01-27 VITALS — DIASTOLIC BLOOD PRESSURE: 67 MMHG | SYSTOLIC BLOOD PRESSURE: 147 MMHG

## 2020-01-27 VITALS — SYSTOLIC BLOOD PRESSURE: 153 MMHG | DIASTOLIC BLOOD PRESSURE: 68 MMHG

## 2020-01-27 VITALS — SYSTOLIC BLOOD PRESSURE: 135 MMHG | DIASTOLIC BLOOD PRESSURE: 80 MMHG

## 2020-01-27 VITALS — DIASTOLIC BLOOD PRESSURE: 87 MMHG | SYSTOLIC BLOOD PRESSURE: 167 MMHG

## 2020-01-27 VITALS — DIASTOLIC BLOOD PRESSURE: 76 MMHG | SYSTOLIC BLOOD PRESSURE: 150 MMHG

## 2020-01-27 LAB
ALBUMIN SERPL BCG-MCNC: 3.2 GM/DL (ref 3.2–5.2)
ALT SERPL W P-5'-P-CCNC: 20 U/L (ref 12–78)
BILIRUB SERPL-MCNC: 0.9 MG/DL (ref 0.2–1)
BUN SERPL-MCNC: 12 MG/DL (ref 7–18)
CALCIUM SERPL-MCNC: 8.7 MG/DL (ref 8.8–10.2)
CHLORIDE SERPL-SCNC: 109 MEQ/L (ref 98–107)
CO2 SERPL-SCNC: 26 MEQ/L (ref 21–32)
CREAT SERPL-MCNC: 0.87 MG/DL (ref 0.55–1.3)
GFR SERPL CREATININE-BSD FRML MDRD: > 60 ML/MIN/{1.73_M2} (ref 32–?)
GLUCOSE SERPL-MCNC: 104 MG/DL (ref 70–100)
HCT VFR BLD AUTO: 36.6 % (ref 36–47)
HGB BLD-MCNC: 12 G/DL (ref 12–15.5)
MAGNESIUM SERPL-MCNC: 1.5 MG/DL (ref 1.8–2.4)
MCH RBC QN AUTO: 31.3 PG (ref 27–33)
MCHC RBC AUTO-ENTMCNC: 32.8 G/DL (ref 32–36.5)
MCV RBC AUTO: 95.6 FL (ref 80–96)
PLATELET # BLD AUTO: 172 10^3/UL (ref 150–450)
POTASSIUM SERPL-SCNC: 3.5 MEQ/L (ref 3.5–5.1)
PROT SERPL-MCNC: 6.3 GM/DL (ref 6.4–8.2)
RBC # BLD AUTO: 3.83 10^6/UL (ref 4–5.4)
SODIUM SERPL-SCNC: 140 MEQ/L (ref 136–145)
WBC # BLD AUTO: 5.1 10^3/UL (ref 4–10)

## 2020-01-27 RX ADMIN — POTASSIUM CHLORIDE SCH MEQ: 750 TABLET, EXTENDED RELEASE ORAL at 09:23

## 2020-01-27 RX ADMIN — ACETAMINOPHEN PRN MG: 325 TABLET ORAL at 20:11

## 2020-01-27 RX ADMIN — SODIUM CHLORIDE, PRESERVATIVE FREE SCH ML: 5 INJECTION INTRAVENOUS at 20:12

## 2020-01-27 RX ADMIN — SODIUM CHLORIDE SCH UNITS: 4.5 INJECTION, SOLUTION INTRAVENOUS at 09:24

## 2020-01-27 RX ADMIN — SODIUM CHLORIDE, PRESERVATIVE FREE SCH ML: 5 INJECTION INTRAVENOUS at 14:57

## 2020-01-27 RX ADMIN — NYSTATIN SCH DOSE: 100000 POWDER TOPICAL at 20:12

## 2020-01-27 RX ADMIN — SODIUM CHLORIDE, PRESERVATIVE FREE SCH ML: 5 INJECTION INTRAVENOUS at 05:49

## 2020-01-27 RX ADMIN — ASPIRIN 325 MG ORAL TABLET SCH MG: 325 PILL ORAL at 09:23

## 2020-01-27 RX ADMIN — FLUTICASONE PROPIONATE SCH PUFF: 110 AEROSOL, METERED RESPIRATORY (INHALATION) at 08:22

## 2020-01-27 RX ADMIN — ACETAMINOPHEN PRN MG: 325 TABLET ORAL at 07:09

## 2020-01-27 RX ADMIN — LOSARTAN POTASSIUM SCH MG: 50 TABLET, FILM COATED ORAL at 09:23

## 2020-01-27 RX ADMIN — SODIUM CHLORIDE SCH UNITS: 4.5 INJECTION, SOLUTION INTRAVENOUS at 20:07

## 2020-01-27 RX ADMIN — NYSTATIN SCH DOSE: 100000 POWDER TOPICAL at 14:56

## 2020-01-27 RX ADMIN — FLUTICASONE PROPIONATE SCH PUFF: 110 AEROSOL, METERED RESPIRATORY (INHALATION) at 20:30

## 2020-01-27 NOTE — REP
AP PORTABLE CHEST:  01/26/2020.

 

Comparison:  01/14/2018.

 

Clinical history:  CVA.

 

Findings:  Elevation right diaphragm again seen.  Some linear atelectatic change

adjacent to it at the CP angle.  The left diaphragm is unremarkable.  There is no

infiltrate or effusion.  Adjacent.  I see no venous hypertension, pleural

effusion or popeye edema.  Heart not enlarged.  Aorta is calcified at the arch but

without aneurysm.  Airway midline.  No widening the mediastinum.  Bones with

degenerative changes in the spine.  No pneumothorax.

 

Impression:

 

1.  Elevated right diaphragm with adjacent linear fibrosis or atelectasis.

 

2.  No cardiomegaly, edema, effusion or acute infiltrate.

 

3.  Some degenerative changes in the spine and vascular calcifications of the

aorta, unchanged.  No new or acute finding.

 

 

Electronically Signed by

Humberto Pastor MD 01/27/2020 09:03 A

## 2020-01-27 NOTE — REP
CT ANGIOGRAM NECK:  01/26/2020.

 

CLINICAL HISTORY:  CVA.

 

TECHNIQUE:  A bolus 100 mL Isovue 370 and scanning through the neck to the skull

base with our CT neck angiogram protocol.  3D surface rendering rotated about the

longitudinal axis of the neck along with curved reformats for right and left

internal carotids.  Standard coronal and sagittal and MIP reformats also

provided.

 

FINDINGS:   Atherosclerotic calcifications of the aortic arch.  There is a

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

as an anatomic variation, right and left as well as subclavian vessel origins

from the arch are present.  The right and left common carotids had a near  common

origin.

 

The aberrant right subclavian shows no stenosis or aneurysm.  Common carotid

shows no stenosis from its origin through the neck.  There is some

atherosclerotic plaque with soft and calcific plaque at the bulb but less than

30% stenosis in that location.  Its course through the neck to the skull base

without stenosis.

 

The left internal carotid from its near common origin also shows no stenosis or

aneurysm in the upper chest or in the neck.  There is some atherosclerotic plaque

evident.   This is mostly in the origin, mid and at the bulb region.  There is

about a 50% stenosis in the proximal left ICA with mixed plaque evident.  The

course of the left internal carotid in the neck to the skull base is

unremarkable.

 

Dominant left vertebral artery arising from the left subclavian.

 

Chronic degenerative disc changes throughout the cervical spine with disc space

narrowing but no compression fractures.  Loss of lordosis is noted.

 

IMPRESSION:

 

1. Approximately 50% stenosis of the proximal left ICA with calcific and soft

plaque and some poststenotic dilatation.  Remainder the left internal carotid in

the neck to the skull base unremarkable.

 

2.  Less than 30% stenosis at the origin of the right internal carotid with its

course to the neck intact.

 

3.  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

trachea and esophagus and making four vessels off the great arch although the

right and left common carotids had a near common origin.

 

 

Electronically Signed by

Humberto Pastor MD 01/27/2020 09:02 A

## 2020-01-27 NOTE — REP
CT BRAIN WITHOUT CONTRAST:  01/26/2020.

 

Comparison:  06/30/2008.

 

Clinical history: Neurologic symptoms.

 

Technique:  Axial soft-tissue and bone windows with coronal soft tissue

reconstructions reviewed.

 

Findings.  Ventriculomegaly with the lateral ventricles midline, symmetric and

proportionate in size to the moderate diffuse cerebral atrophy.  Third and fourth

ventricles are also dilated in proportion.  There is slight progression over the

last 12 years.  Lacunar infarct in the right basal ganglia at the anterior margin

of the thalamus.  A couple of tiny punctate hypodensities in the head of the

caudate nucleus on each side.  Heterogeneous low attenuation white matter changes

bilaterally suggesting chronic small vessel white matter ischemic changes,

similar to previous study.  No vascular territory infarct, intracranial

hemorrhage, extra-axial fluid collection or mass is identified.  Brainstem was

unremarkable.  Cerebellum shows diffuse atrophy.  Vascular calcification in the

basilar artery.  Basal cisterns intact.  Mastoids and visualized sinuses clear.

The calvarium and skull base were unremarkable.  Atherosclerotic calcification of

the carotid siphons.  The skull base and calvarium show no fracture or focal

lesion.

 

Impression:

 

1.  Chronic small vessel white matter ischemic changes, ventriculomegaly with

diffuse cortical atrophy, age appropriate and heavy vascular calcifications

carotid siphons and basilar artery.

 

2.  No acute infarct, intracranial hemorrhage, mass, mass effect or edema.

 

3.  Mastoids, sinuses, skull base and calvarium grossly intact.

 

 

Electronically Signed by

Humberto Pastor MD 01/27/2020 09:03 A

## 2020-01-27 NOTE — ECHO
DATE OF PROCEDURE:  01/27/2020

 

REFERRING PHYSICIAN:  Dr. Andrei Denton

 

INDICATION:  Cerebrovascular accident.

 

Height 170 cm, weight 96 kg.

 

DIMENSIONS:

IVS:  1.2

LV:  3.7

LVPW:  1.2

LA:  4.1

Aorta:  2.7

IVC:  1.6

Mitral E wave velocity:  65

A wave:  109

E prime septal:  4.5

E prime lateral:  7.1

 

FINDINGS:

The study is of limited technical quality with difficult visualization.  The

patient is in sinus rhythm.

 

Left ventricle is normal size and has normal systolic function, I estimate

ejection fraction (EF) around 65-70%.  No apparent segmental wall motion

abnormalities are appreciated.  Right ventricle also appears grossly normal.

Both atria appear at least mildly enlarged.  There are mild degenerative

abnormalities of aortic and mitral valve but mobility of both is preserved.

Tricuspid valve appears normal.  Pulmonic valve was not well seen.  No

pericardial effusion is noted.  Inferior vena cava is of normal size.  Aortic

root is normal.  Aortic arch and abdominal aorta were not well seen.

 

Doppler interrogation reveals competent aortic and mitral valve.  Trace

insufficiency on tricuspid valve is seen.  Calculated pulmonary artery pressure

is in 30s corresponding to mild pulmonary hypertension.

 

Mitral inflow pattern and tissue Doppler imaging of mitral annulus revealed grade

1 diastolic dysfunction.

 

CONCLUSIONS:

1.  Study is of fair technical quality.  The patient is in sinus rhythm.

2.  Normal left ventricular (LV) size with mild left ventricular hypertrophy

(LVH), normal LV systolic function and grade 1 diastolic dysfunction.

3.  No hemodynamically significant valvular disease.

4.  Likely normal central venous pressure and mild pulmonary hypertension.

 

COMMENT:  Subacute bacterial endocarditis (SBE) prophylaxis is not recommended.

Study overall most consistent with mild hypertensive heart disease.  No obvious

abnormalities to explain cerebrovascular accident.

## 2020-01-27 NOTE — REPVR
PROCEDURE INFORMATION: 

Exam: MR Head Without Contrast 

Exam date and time: 1/27/2020 12:46 PM 

Age: 86 years old 

Clinical indication: Altered mental status/memory loss; Amnesia, not specified; 

Additional info: Global amnesia, TIA 



TECHNIQUE: 

Imaging protocol: MR of the head without contrast. 



COMPARISON: 

CT Head without contrast 1/26/2020 2:37 PM 



FINDINGS: 

Brain: No acute infarct or mesial temporal lobe diffusion restriction 

identified. No parenchymal hemorrhage. The brain demonstrates mild generalized 

volume loss. Patchy foci of increased signal intensity in the deep and 

subcortical white matter on the T2 weighted imaging most likely representing 

chronic small vessel ischemic change. Focal, chronic left cerebellar infarcts. 

Ventricles: The ventricles appear mildly enlarged in keeping with volume loss. 

Bones/joints: Unremarkable. 

Soft tissues: Unremarkable. 

Sinuses: Retention cyst or polyp in the right maxillary sinus. Mild-to-moderate 

polypoid mucosal thickening in the inferior left maxillary sinus. Trace ethmoid 

and sphenoid sinus mucosal thickening. No air-fluid levels. 

Mastoid air cells: No significant mastoid effusions. There is trace left 

mastoid fluid. 

Orbits: Unremarkable. 





IMPRESSION: 

No evidence of acute infarct. 



Electronically signed by: Steph Disla On 01/27/2020  13:23:17 PM

## 2020-01-27 NOTE — CR
DATE OF CONSULTATION:  01/27/2020

 

NEUROLOGY CONSULTATION

 

REFERRING PROVIDER:  Dr. Andrei Denton

 

HISTORY OF PRESENTING ILLNESS:  Yi Nunez is an 86-year-old female with a

past medical history significant for hypertension, hyperlipidemia, presenting

with a chief complaint of experiencing sudden onset of confusion.  The patient

[place]  [place] She told her  family that she did not feel, get out of

the area.  The patient contacted her son, and she met the son in the food court

in the mall.  From there, the patient does not recall how she got to the

emergency department.  The patient had a significant chunk of time where she was

quite forgetful and confused.  CT scan of the head was negative for any acute

stroke.  MRI of the brain was confirmed to be negative for any acute stroke.  
The

patient had heavy calcifications in bilateral carotid siphons; there was ICA 50%

stenosis, right ICA 30% stenosis.  At baseline, the patient was taking aspirin 
81

mg daily at home.  Since this admission, due to possibility of transient global

amnesia caused by transient ischemic attack (TIA), the patient's aspirin was

increased to 325 mg daily.  Lipitor 40 mg daily was maintained.  At the present

time, the patient denies any headache, but she did have an intense headache last

night.  It seems she has significant headache across the forehead described with

light sensitivity and sound sensitivity, throbbing character.  She has had sinus

headaches in the past, but were not associated with light sensitivity.

 

REVIEW OF SYSTEMS:  14-point review of systems obtained and is negative except 
as

per history of the present illness.  The patient denies any diplopia, vertigo,

dysarthria, dysphagia, ataxia at this time.

 

HOME MEDICATIONS:  Acetaminophen, aspirin 81 mg by mouth daily, atorvastatin 40

mg by mouth daily, Bacillus/Protease/Amylase/Lipase, Coenzyme Q10,

hydroxychloroquine 200 mg twice a day, losartan/hydrochlorothiazide 100-12.5 mg

by mouth daily, Omega 3 fatty acid/fish oil 1000 mg two capsules by mouth daily,

tumeric 500 mg by mouth daily, vitamins A, C, E/Lutein/Mineral one tablet by

mouth daily.  Ondansetron, tramadol as needed, Imodium as needed.

 

ALLERGIES:  CEFACLOR, CEPHALOSPORINS, CODEINE, ERYTHROMYCIN, PENICILLIN,

POLYETHYLENE GLYCOL, SULFA, SULFAMETHOXAZOLE, TRIMETHOPRIM, ERYTHROMYCIN, TUNA.

 

PAST MEDICAL HISTORY:  Hypertension, hyperlipidemia, gastroesophageal reflux

disease, depression, anxiety, inflammatory arthritis, history of parvovirus

infection.

 

PAST SURGICAL HISTORY:  Tonsillectomy, appendectomy, transvaginal hysterectomy, 
right breast

sebaceous cyst removal, colonoscopy, left carpal tunnel release, right carpal

tunnel release, bilateral ptosis repair, colonoscopy, hyperplastic polyps

removed.

 

FAMILY HISTORY:  Noncontributory.

 

SOCIAL HISTORY:  The patient denies use of any tobacco, alcohol or illicit

drugs.

 

PHYSICAL EXAMINATION:  Blood pressure is 141/63, pulse rate 76, respiratory rate

is 18, temperature is 97.6 degrees Fahrenheit, oxygenation 95% on room air.  The

patient is alert, oriented to person, place and time.  Speech, language,

comprehension, and repetition are intact.  Pupils are 2-1/2 mm, round and

reactive to light.  Extraocular movements are intact in all directions without

nystagmus.  Sensation V1, V2, V3 is intact to light touch.  No facial asymmetry

to activation.  Palate elevates symmetrically.  Tongue is midline.  No weakness

of sternocleidomastoids bilaterally.  Hearing is subjectively equal to finger

rub.  There is no pronator drift.  Strength is 5/5 including bilateral deltoids,

biceps, triceps, handgrip, iliopsoas, quadriceps, anterior tibialis.  Deep 
tendon

reflexes are absent at the Achilles, trace at the patellas and 2s in the upper

extremities.  Romberg testing completed and is negative.  Sensory is intact to

light touch in all four extremities.  Coordination without any gross ataxia or

dysmetria on finger-to-nose.

 

ASSESSMENT:

1.  Transient global amnesia.  Etiologies include possible TIA versus migraine

given intense headache the night prior.

 

PLAN:

1.  Agree with increasing aspirin to 325 mg by mouth daily.  Continue

atorvastatin 40 mg by mouth nightly.  MRI confirms no acute stroke.  Continue

telemetry monitoring.  Recommend echocardiogram.  The patient can be discharged

home hopefully tomorrow and follow up in the [facility] office.

MARGARITA

## 2020-01-27 NOTE — REP
CT ANGIOGRAM HEAD:  01/26/2020.

 

CLINICAL HISTORY:  CVA symptoms.

 

TECHNIQUE:  Bolus of 100 mL Isovue 370 and scanning through the brain with our CT

angiogram brain protocol.

 

FINDINGS:   There is no intracranial hemorrhage on the precontrast study.  After

contrast administration, there is dominant left vertebral artery contribution to

the basilar artery.  There is no basilar stenosis or aneurysm.  Very mild

tortuosity of the basilar artery.  I see no basilar tip aneurysm.  The posterior

cerebral arteries and superior cerebellar arteries show symmetric origins.  Those

posterior cerebral arteries have symmetric feeds to the posterior fossa.  There

is a subtle stenosis of the proximal left PCA but the posterior circulation

otherwise intact.

 

The right internal carotid from the skull base to the cavernous sinus showed no

stenosis.  There is atherosclerotic plaque within the artery in the cavernous

sinus and supraclinoid region.  There is no stenosis or aneurysm in these areas.

There is no vessel cutoff.  The A1 segment shows no significant stenosis.  The A2

segment is unremarkable and symmetric..  The M1 segments is without stenosis.

The M2 and M3 segments intact.

 

The left internal carotid from the skull base to the carotid siphon is grossly

intact.  There is heavy vascular calcification in the carotid siphon and

supraclinoid region A1 segment is without significant stenosis.  The A2 segment

intact.  The M1 segment, M2 and M3 segments are also without significant

stenosis.  There is no aneurysm.

 

IMPRESSION:

 

1.  Heavy vascular calcification in both carotid siphons and some in the

vertebral basilar artery.

 

2.  There is no aneurysm or vessel cutoff.

 

3.  Some mild stenosis proximal left PCA just past the origin and the basilar

tip.  Symmetric supply to the posterior fossa. No aneurysm or significant

stenosis.

 

4. A1 segments, M1 segments, M3 and the A2 segments are all grossly intact

without stenosis, aneurysm or vessel cutoff.

 

 

Electronically Signed by

Humberto Pastor MD 01/27/2020 09:02 A

## 2020-01-27 NOTE — IPNPDOC
Subjective


Date Seen


The patient was seen on 1/27/20.





Subjective


Chief Complaint/HPI


Patient is comfortable offers no new complaints, awaiting MRI of the brain


General:  Denies: ROS Unobtainable, Chills, Night Sweats, Fatigue, Malaise, 

Normal Appetite, Other Symptoms


Constitutional:  Denies: Chills, Fever, Malaise, Night Sweats, Weakness, 

Fatigue, Weight Loss, Lethargy, Other


Eyes:  Denies: Pain, Vision change, Conjunctivae inflammation, Eyelid 

inflammation, Redness, Other


ENT:  Denies: Head Aches, Ear Pain, Dysphagia


Skin:  Denies: Rash, Lesions, Jaundice, Bruising, Itching, Dry, Breakdown, Nail 

Changes, Other


Pulmonary:  Denies: Dyspnea, Cough, Pleuritic Chest Pain, Other Symptoms


Cardiovascular:  Denies: Chest Pain, Palpitations, Orthopnea, Paroxysmal Noc. 

Dyspnea, Edema, Lt Headedness, Other Symptoms


Gastrointestinal:  Denies: Nausea, Vomiting, Abdominal Pain, Diarrhea, 

Constipation, Melena, Hematochezia, Other Symptoms


Endocrine:  Denies: Polydipsia, Polyphagia, Polyuria, Heat Intolerance, Cold 

Intolerance, Other Endocrine Sx


Musculoskeletal:  Denies: Neck Pain, Back Pain, Shoulder Pain, Arm Pain, Hand 

Pain, Leg Pain, Foot Pain, Joint Pain, Muscle Pain, Spasms, Other Symptoms


Neurological:  Denies: Weakness, Numbness, Incoordination, Change in speech, 

Confusion, Seizures, Other Symptoms





Objective


Physical Examination


ENT Exam:  Positive: Atraumatic, Mucous membr. moist/pink


Neck Exam:  Positive: Supple


Chest Exam:  Positive: Clear to auscultation, Normal air movement


Heart Exam:  Positive: Rate Normal, Normal S1, Normal S2


Abdomen Exam:  Positive: Normal bowel sounds, Soft


Extremity Exam:  Positive: Normal pulses


Skin Exam:  Positive: Nl turgor and temperature


Neuro Exam:  Positive: Normal Gait, Strength at 5/5 X4 ext, Sensation Intact, 

Cranial Nerves 3-12 NL





Assessment /Plan


Problems





(1) Amnesia, global, transient


Status:  Acute


Problem Text:  CTA Head:1.  Heavy vascular calcification in both carotid siphons

and some in the


vertebral basilar artery. 


2.  There is no aneurysm or vessel cutoff. 


3.  Some mild stenosis proximal left PCA just past the origin and the basilar


tip.  Symmetric supply to the posterior fossa. No aneurysm or significant


stenosis. 


4. A1 segments, M1 segments, M3 and the A2 segments are all grossly intact





CT NECK:1. Approximately 50% stenosis of the proximal left ICA with calcific and

soft


plaque and some poststenotic dilatation.  Remainder the left internal carotid in


the neck to the skull base unremarkable. 


2.  Less than 30% stenosis at the origin of the right internal carotid with its


course to the neck intact. 


3.  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


trachea and esophagus and making four vessels off the great arch although the


right and left common carotids had a near common origin.





CT HEAD:1.  Chronic small vessel white matter ischemic changes, ventriculomegaly

with


diffuse cortical atrophy, age appropriate and heavy vascular calcifications


carotid siphons and basilar artery. 


2.  No acute infarct, intracranial hemorrhage, mass, mass effect or edema. 


3.  Mastoids, sinuses, skull base and calvarium grossly intact.





Patient is clinically stable. No further neuro symptoms noted


PT, OT eval pending


MRI of the brain pending


Neurology consult is pending


Continue present medication


 


 





(2) Hypomagnesemia


Problem Text:  Mag So4 g IV piggyback 1


Repeat levels in a.m.





(3) Hypokalemia


Status:  Resolved





Plan/VTE


VTE Prophylaxis Ordered?:  Yes





VS, I&O, 24H, Fishbone


Vital Signs/I&O





Vital Signs








  Date Time  Temp Pulse Resp B/P (MAP) Pulse Ox O2 Delivery O2 Flow Rate FiO2


 


1/27/20 09:24  87  147/67    


 


1/27/20 07:51 98.0  18  93 Room Air  














I&O- Last 24 Hours up to 6 AM 


 


 1/27/20





 05:59


 


Intake Total 0 ml


 


Output Total 0 ml


 


Balance 0 ml











Laboratory Data


24H LABS


Laboratory Tests 2


1/26/20 14:38: Bedside Glucose (Misc Panel) 105


1/26/20 14:41: 


Immature Granulocyte % (Auto) 0.4, Neutrophils (%) (Auto) 49.2, Lymphocytes (%) 

(Auto) 32.5, Monocytes (%) (Auto) 11.2H, Eosinophils (%) (Auto) 5.6H, Basophils 

(%) (Auto) 1.1H, Neutrophils # (Auto) 2.6, Lymphocytes # (Auto) 1.7, Monocytes #

(Auto) 0.6, Eosinophils # (Auto) 0.3, Basophils # (Auto) 0.1, Nucleated Red 

Blood Cells % (auto) 0.0, Prothrombin Time 13.1, Prothromb Time International 

Ratio 1.02, Activated Partial Thromboplast Time 28.7, Total Creatine Kinase 148,

Creatine Kinase MB 3.0, Creatine Kinase MB Relative Index 2.03, Troponin I < 

0.02


1/26/20 14:42: 


POC Glucose (Misc Panel) 105, POC Sodium (Misc Panel) 142, POC Potassium (Misc 

Panel) 3.3L, POC Chloride (Misc Panel) 105, POC Total CO2 (Misc Panel) 27.0, POC

Blood Urea Nitrogen (Misc Panel 16, POC Ionized Calcium (Misc Panel) 4.7, POC 

Creatinine (Misc Panel) 0.9, POC Hematocrit (Misc Panel) 41.0


1/26/20 15:12: 


Bedside Prothrombin Time INR 1.1, Prothrombin Time (MISC) 13.6


1/27/20 05:11: 


Nucleated Red Blood Cells % (auto) 0.0, Anion Gap 5L, Glomerular Filtration Rate

> 60.0, Calcium Level 8.7L, Magnesium Level 1.5L, Total Bilirubin 0.9, Aspartate

Amino Transf (AST/SGOT) 23, Alanine Aminotransferase (ALT/SGPT) 20, Alkaline 

Phosphatase 69, Total Protein 6.3L, Albumin 3.2, Albumin/Globulin Ratio 1.03


CBC/BMP


Laboratory Tests


1/26/20 14:41








1/27/20 05:11

















TIFFANY GASPAR MD              Jan 27, 2020 10:04

## 2020-01-28 VITALS — DIASTOLIC BLOOD PRESSURE: 74 MMHG | SYSTOLIC BLOOD PRESSURE: 158 MMHG

## 2020-01-28 VITALS — SYSTOLIC BLOOD PRESSURE: 161 MMHG | DIASTOLIC BLOOD PRESSURE: 79 MMHG

## 2020-01-28 VITALS — DIASTOLIC BLOOD PRESSURE: 58 MMHG | SYSTOLIC BLOOD PRESSURE: 110 MMHG

## 2020-01-28 VITALS — DIASTOLIC BLOOD PRESSURE: 79 MMHG | SYSTOLIC BLOOD PRESSURE: 161 MMHG

## 2020-01-28 LAB
ALBUMIN SERPL BCG-MCNC: 3.2 GM/DL (ref 3.2–5.2)
ALT SERPL W P-5'-P-CCNC: 24 U/L (ref 12–78)
BILIRUB SERPL-MCNC: 0.8 MG/DL (ref 0.2–1)
BUN SERPL-MCNC: 10 MG/DL (ref 7–18)
CALCIUM SERPL-MCNC: 8.6 MG/DL (ref 8.8–10.2)
CHLORIDE SERPL-SCNC: 110 MEQ/L (ref 98–107)
CO2 SERPL-SCNC: 27 MEQ/L (ref 21–32)
CREAT SERPL-MCNC: 0.84 MG/DL (ref 0.55–1.3)
GFR SERPL CREATININE-BSD FRML MDRD: > 60 ML/MIN/{1.73_M2} (ref 32–?)
GLUCOSE SERPL-MCNC: 90 MG/DL (ref 70–100)
HCT VFR BLD AUTO: 37.6 % (ref 36–47)
HGB BLD-MCNC: 12.2 G/DL (ref 12–15.5)
MAGNESIUM SERPL-MCNC: 2.5 MG/DL (ref 1.8–2.4)
MCH RBC QN AUTO: 31.6 PG (ref 27–33)
MCHC RBC AUTO-ENTMCNC: 32.4 G/DL (ref 32–36.5)
MCV RBC AUTO: 97.4 FL (ref 80–96)
PLATELET # BLD AUTO: 165 10^3/UL (ref 150–450)
POTASSIUM SERPL-SCNC: 3.4 MEQ/L (ref 3.5–5.1)
POTASSIUM SERPL-SCNC: 3.9 MEQ/L (ref 3.5–5.1)
PROT SERPL-MCNC: 6.5 GM/DL (ref 6.4–8.2)
RBC # BLD AUTO: 3.86 10^6/UL (ref 4–5.4)
SODIUM SERPL-SCNC: 143 MEQ/L (ref 136–145)
WBC # BLD AUTO: 4.9 10^3/UL (ref 4–10)

## 2020-01-28 RX ADMIN — NYSTATIN SCH DOSE: 100000 POWDER TOPICAL at 09:27

## 2020-01-28 RX ADMIN — MAGNESIUM SULFATE IN DEXTROSE SCH MLS/HR: 10 INJECTION, SOLUTION INTRAVENOUS at 06:18

## 2020-01-28 RX ADMIN — LOSARTAN POTASSIUM SCH MG: 50 TABLET, FILM COATED ORAL at 09:26

## 2020-01-28 RX ADMIN — FLUTICASONE PROPIONATE SCH PUFF: 110 AEROSOL, METERED RESPIRATORY (INHALATION) at 09:00

## 2020-01-28 RX ADMIN — POTASSIUM CHLORIDE SCH MEQ: 750 TABLET, EXTENDED RELEASE ORAL at 09:25

## 2020-01-28 RX ADMIN — SODIUM CHLORIDE, PRESERVATIVE FREE SCH ML: 5 INJECTION INTRAVENOUS at 05:22

## 2020-01-28 RX ADMIN — SODIUM CHLORIDE SCH UNITS: 4.5 INJECTION, SOLUTION INTRAVENOUS at 09:27

## 2020-01-28 RX ADMIN — ASPIRIN 325 MG ORAL TABLET SCH MG: 325 PILL ORAL at 09:25

## 2020-01-28 RX ADMIN — MAGNESIUM SULFATE IN DEXTROSE SCH MLS/HR: 10 INJECTION, SOLUTION INTRAVENOUS at 05:22

## 2020-01-28 NOTE — DS.PDOC
Discharge Summary


General


Date of Admission


Jan 27, 2020 at 09:57


Date of Discharge


1/28/20





Discharge Summary


PROCEDURES PERFORMED DURING STAY: None.





ADMITTING DIAGNOSES: 


1. Transient global amnesia.





DISCHARGE DIAGNOSES:


1. As it global amnesia, TIA.





COMPLICATIONS/CHIEF COMPLAINT: Amnesia, Global, Transient, Tia.





HISTORY OF PRESENT ILLNESS: This is 86 years old white female with past medical 

history of hypertension, hyperlipidemia, GERD, depression, anxiety, dyspnea on 

exertion inflammatory arthritis, previous parvovirus infection was with her 

friend at the movie theater today. She developed an episode of confusion. 

Patient does not remember anything until she was brought to the ED and she was 

in the bed. It is not available to get this detailed history, but as per 

patient's EMS EMR and from physician. History was obtained that she got an 

episode of confusion and forgetfulness for quite some time as she got to the 

emergency room note. Patient denies chest pain, nausea, vomiting, shortness of 

breath or any weakness.





HOSPITAL COURSE:  Patient was admitted with the transient global amnesia. Once 

she was at the movie theater. Her CT of the head in the ED was essentially 

normal and further workup as follows





  CTA Head:1.  Heavy vascular calcification in both carotid siphons and some in 

the


vertebral basilar artery. 


2.  There is no aneurysm or vessel cutoff. 


3.  Some mild stenosis proximal left PCA just past the origin and the basilar


tip.  Symmetric supply to the posterior fossa. No aneurysm or significant


stenosis. 


4. A1 segments, M1 segments, M3 and the A2 segments are all grossly intact





CT NECK:1. Approximately 50% stenosis of the proximal left ICA with calcific and

soft


plaque and some poststenotic dilatation.  Remainder the left internal carotid in


the neck to the skull base unremarkable. 


2.  Less than 30% stenosis at the origin of the right internal carotid with its


course to the neck intact. 


3.  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


trachea and esophagus and making four vessels off the great arch although the


right and left common carotids had a near common origin.





CT HEAD:1.  Chronic small vessel white matter ischemic changes, ventriculomegaly

with


diffuse cortical atrophy, age appropriate and heavy vascular calcifications


carotid siphons and basilar artery. 


2.  No acute infarct, intracranial hemorrhage, mass, mass effect or edema. 


3.  Mastoids, sinuses, skull base and calvarium grossly intact.





. She was seen by neurology and recommended to increase aspirin to 325 mg 

continue Lipitor and follow-up with neurology as an outpatient.


Patient is clinically stable, asymptomatic. Physical therapy has cleared out and

she will be discharged home today to follow with neurology as an outpatient in 

one week. ]. 





DISCHARGE MEDICATIONS: Please see below.


 


ALLERGIES: Please see below.





PHYSICAL EXAMINATION ON DISCHARGE:


VITAL SIGNS: Please see below.


GENERAL: Within normal limits


HEENT: PERRLA. Extraocular muscles intact


NECK: Supple


CARDIOVASCULAR EXAMINATION: S1, S2, regular


RESPIRATORY EXAMINATION: Clear to A&P


ABDOMINAL EXAMINATION: Benign


EXTREMITIES: No clubbing, cyanosis, edema


SKIN: Normal


NEUROLOGICAL EXAMINATION: . No focal motor sensory deficit 


PSYCHIATRIC EXAMINATION: Normal





LABORATORY DATA: Please see below.





IMAGING: MRI of brain:No evidence of acute infarct. 


Echocardiogram:CONCLUSIONS:


1.  Study is of fair technical quality.  The patient is in sinus rhythm.


2.  Normal left ventricular (LV) size with mild left ventricular hypertrophy


(LVH), normal LV systolic function and grade 1 diastolic dysfunction.


3.  No hemodynamically significant valvular disease.


4.  Likely normal central venous pressure and mild pulmonary hypertension.





PROGNOSIS: GERD





ACTIVITY: As tolerated.





DIET: As tolerated





DISCHARGE PLAN: Home





DISPOSITION: 01 Home, Self-Care.





DISCHARGE INSTRUCTIONS:


1. As per discharge instructions.





ITEMS TO FOLLOWUP ON ON OUTPATIENT:


1. Follow with [facility] in one week.





DISCHARGE CONDITION: Stable.





TIME SPENT ON DISCHARGE: 36 minutes.





Vital Signs/I&Os





Vital Signs








  Date Time  Temp Pulse Resp B/P (MAP) Pulse Ox O2 Delivery O2 Flow Rate FiO2


 


1/28/20 09:26    161/79    


 


1/28/20 09:26  61      


 


1/28/20 07:59 97.4  18  92 Room Air  














I&O- Last 24 Hours up to 6 AM 


 


 1/28/20





 06:00


 


Intake Total 890 ml


 


Output Total 700 ml


 


Balance 190 ml











Laboratory Data


Labs 24H


Laboratory Tests 2


1/27/20 15:31: 


Urine Color YELLOW, Urine Appearance CLEAR, Urine pH 7.0, Urine Specific Gravity

1.010, Urine Protein NEGATIVE, Urine Glucose (UA) NEGATIVE, Urine Ketones 

NEGATIVE, Urine Blood NEGATIVE, Urine Nitrite NEGATIVE, Urine Bilirubin 

NEGATIVE, Urine Urobilinogen 0.2, Urine Leukocyte Esterase TRACEH, Urine WBC 

(Auto) 4H, Urine RBC (Auto) 1, Urine Hyaline Casts (Auto) 0, Urine Bacteria 

(Auto) NEGATIVE, Urine Squamous Epithelial Cells 1, Urine Sperm (Auto) 


1/28/20 05:05: 


Nucleated Red Blood Cells % (auto) 0.0, Anion Gap 6L, Glomerular Filtration Rate

> 60.0, Calcium Level 8.6L, Total Bilirubin 0.8, Aspartate Amino Transf 

(AST/SGOT) 37, Alanine Aminotransferase (ALT/SGPT) 24, Alkaline Phosphatase 67, 

Total Protein 6.5, Albumin 3.2, Albumin/Globulin Ratio 0.97L


1/28/20 11:39: Magnesium Level 2.5H


CBC/BMP


Laboratory Tests


1/28/20 05:05








1/28/20 11:39











Microbiology





Microbiology


1/27/20 Urine Culture - Final, Complete





Discharge Medications


Scheduled


Acetaminophen (Tylenol Arthritis) 650 Mg Tab, 650 MG PO Q8H, (Reported)


Aspirin (Aspirin) 325 Mg Tablet, 325 MG GT DAILY


Atorvastatin Calcium (Atorvastatin Calcium) 40 Mg Tab, 40 MG PO DAILY, 

(Reported)


Bacillus/Protease/Amylas/Lipas (Digest Adv Intensive Bowel Cap) 1 Cap Cap, 1 CAP

PO QHS, (Reported)


C,E,Zinc,Copper 24/Om3/Lut/Elly (Ocuvite Adult 50 Plus Softgel) 1 Each Capsule, 1

CAP PO QHS, (Reported)


Calcium Carb,Gluc/Mag Ox,Gluc (Calcium Magnesium Caplet) 1 Each Tablet, 1 EACH 

PO DAILY, (Reported)


Carvedilol (Carvedilol) 12.5 Mg Tab, 12.5 MG PO BID, (Reported)


Hydroxychloroquine Sulfate (Hydroxychloroquine Sulfate) 200 Mg Tab, 200 MG PO 

BID, (Reported)


Losartan/Hydrochlorothiazide (Losartan-Hctz 100-12.5 mg Tab) 1 Each Tablet, 1 

TAB PO DAILY, (Reported)


Magnesium Oxide (Magnesium Oxide) 400 Mg Tablet, 1 TAB PO DAILY for constipation


Omega-3/Dha/Epa/Fish Oil (Fish Oil 1,000 mg Softgel) 1 Each Capsule, 1 CAP PO 

DAILY, (Reported)


Potassium Chloride (Klor-Con M10) 10 Meq Tab.er.prt, 10 MEQ PO DAILY


Turmeric/Turmeric Root Extract (Turmeric 450-50 mg Capsule) 500 Mg Cap, 1 TAB PO

DAILY, (Reported)


Ubidecarenone (Coenzyme Q10) 100 Mg Capsule, 100 MG PO DAILY, (Reported)





Scheduled PRN


Loperamide HCl (Imodium A-D) 2 Mg Capsule, 2 MG PO Q6H PRN for DIARRHEA, 

(Reported)





Allergies


Coded Allergies:  


     Tuna (Unverified  Allergy, Severe, oral swelling, 5/1/17)


     Cephalosporins (Verified  Allergy, Unknown, 1/26/20)


     Penicillins (Verified  Allergy, Unknown, 1/26/20)


     Sulfa (Sulfonamide Antibiotics) (Verified  Allergy, Unknown, 1/26/20)


     cefaclor (Verified  Allergy, Unknown, 1/26/20)


     codeine (Verified  Allergy, Unknown, 1/26/20)


     erythromycin base (Verified  Allergy, Unknown, 1/26/20)


     polyethylene glycol 3350 (Verified  Allergy, Unknown, 1/26/20)


     trimethoprim (Verified  Allergy, Unknown, 1/26/20)











TIFFANY GASPAR MD              Jan 28, 2020 13:54

## 2020-01-30 NOTE — [facility]GE[facility]IP
[facility]

                                       

                                       Test Date:    2020

Pat Name:     SAUL WU         Department:   

Patient ID:   [id number]                 Room:         [place] [address]

Gender:       Female                   Technician:   RAY

:          1933               Requested By: TIFFANY GASPAR 

Order Number: [id number]     Reading MD:   Kian Lanier

                                 Measurements

Intervals                              Axis          

Rate:         87                       P:            70

OR:           229                      QRS:          -35

QRSD:         98                       T:            28

QT:           389                                    

QTc:          471                                    

                           Interpretive Statements

SINUS RHYTHM WITH FIRST DEGREE AV BLOCK

MARKED LEFT AXIS DEVIATION

Compared to prior tracings in the system, no significant changes

Electronically Signed on 2020 0:41:48 EST by Kian Lanier

## 2020-08-06 ENCOUNTER — HOSPITAL ENCOUNTER (OUTPATIENT)
Dept: HOSPITAL 53 - M SFHCPLAZ | Age: 85
End: 2020-08-06
Attending: INTERNAL MEDICINE
Payer: MEDICARE

## 2020-08-06 DIAGNOSIS — I10: Primary | ICD-10-CM

## 2020-08-06 DIAGNOSIS — E78.00: ICD-10-CM

## 2020-08-06 DIAGNOSIS — M15.4: ICD-10-CM

## 2020-08-06 DIAGNOSIS — K21.9: ICD-10-CM

## 2020-10-01 LAB
ERYTHROCYTE [SEDIMENTATION RATE] IN BLOOD BY WESTERGREN METHOD: 12 MM/HR (ref 0–30)
HCT VFR BLD AUTO: 40.7 % (ref 36–47)
HGB BLD-MCNC: 13.3 G/DL (ref 12–15.5)
MCH RBC QN AUTO: 32.3 PG (ref 27–33)
MCHC RBC AUTO-ENTMCNC: 32.7 G/DL (ref 32–36.5)
MCV RBC AUTO: 98.8 FL (ref 80–96)
PLATELET # BLD AUTO: 194 10^3/UL (ref 150–450)
RBC # BLD AUTO: 4.12 10^6/UL (ref 4–5.4)
WBC # BLD AUTO: 5.6 10^3/UL (ref 4–10)

## 2020-10-03 LAB
ALBUMIN SERPL BCG-MCNC: 3.6 GM/DL (ref 3.2–5.2)
ALT SERPL W P-5'-P-CCNC: 26 U/L (ref 12–78)
BILIRUB SERPL-MCNC: 0.5 MG/DL (ref 0.2–1)
BUN SERPL-MCNC: 20 MG/DL (ref 7–18)
CALCIUM SERPL-MCNC: 9.2 MG/DL (ref 8.8–10.2)
CHLORIDE SERPL-SCNC: 111 MEQ/L (ref 98–107)
CHOLEST SERPL-MCNC: 169 MG/DL (ref ?–200)
CHOLEST/HDLC SERPL: 2.45 {RATIO} (ref ?–5)
CO2 SERPL-SCNC: 31 MEQ/L (ref 21–32)
CREAT SERPL-MCNC: 0.93 MG/DL (ref 0.55–1.3)
CRP SERPL-MCNC: 0.3 MG/DL (ref 0–0.3)
GFR SERPL CREATININE-BSD FRML MDRD: > 60 ML/MIN/{1.73_M2} (ref 32–?)
GLUCOSE SERPL-MCNC: 93 MG/DL (ref 70–100)
HDLC SERPL-MCNC: 69 MG/DL (ref 40–?)
LDLC SERPL CALC-MCNC: 76 MG/DL (ref ?–100)
MAGNESIUM SERPL-MCNC: 2 MG/DL (ref 1.8–2.4)
NONHDLC SERPL-MCNC: 100 MG/DL
POTASSIUM SERPL-SCNC: 4 MEQ/L (ref 3.5–5.1)
PROT SERPL-MCNC: 6.4 GM/DL (ref 6.4–8.2)
SODIUM SERPL-SCNC: 145 MEQ/L (ref 136–145)
TRIGL SERPL-MCNC: 118 MG/DL (ref ?–150)

## 2021-04-07 ENCOUNTER — HOSPITAL ENCOUNTER (OUTPATIENT)
Dept: HOSPITAL 53 - M PLALAB | Age: 86
End: 2021-04-07
Attending: INTERNAL MEDICINE
Payer: MEDICARE

## 2021-04-07 DIAGNOSIS — I10: ICD-10-CM

## 2021-04-07 DIAGNOSIS — E78.00: ICD-10-CM

## 2021-04-07 DIAGNOSIS — K21.9: Primary | ICD-10-CM

## 2021-04-07 DIAGNOSIS — M15.4: ICD-10-CM

## 2021-04-07 LAB
ALBUMIN SERPL BCG-MCNC: 3.7 GM/DL (ref 3.2–5.2)
ALT SERPL W P-5'-P-CCNC: 22 U/L (ref 12–78)
BILIRUB SERPL-MCNC: 0.7 MG/DL (ref 0.2–1)
BUN SERPL-MCNC: 19 MG/DL (ref 7–18)
CALCIUM SERPL-MCNC: 9.3 MG/DL (ref 8.8–10.2)
CHLORIDE SERPL-SCNC: 110 MEQ/L (ref 98–107)
CHOLEST SERPL-MCNC: 188 MG/DL (ref ?–200)
CHOLEST/HDLC SERPL: 2.65 {RATIO} (ref ?–5)
CO2 SERPL-SCNC: 31 MEQ/L (ref 21–32)
CREAT SERPL-MCNC: 0.86 MG/DL (ref 0.55–1.3)
CRP SERPL-MCNC: 0.3 MG/DL (ref 0–0.3)
ERYTHROCYTE [SEDIMENTATION RATE] IN BLOOD BY WESTERGREN METHOD: 12 MM/HR (ref 0–30)
GFR SERPL CREATININE-BSD FRML MDRD: > 60 ML/MIN/{1.73_M2} (ref 32–?)
GLUCOSE SERPL-MCNC: 110 MG/DL (ref 70–100)
HCT VFR BLD AUTO: 40.5 % (ref 36–47)
HDLC SERPL-MCNC: 71 MG/DL (ref 40–?)
HGB BLD-MCNC: 13.3 G/DL (ref 12–15.5)
LDLC SERPL CALC-MCNC: 93 MG/DL (ref ?–100)
MAGNESIUM SERPL-MCNC: 2 MG/DL (ref 1.8–2.4)
MCH RBC QN AUTO: 32.1 PG (ref 27–33)
MCHC RBC AUTO-ENTMCNC: 32.8 G/DL (ref 32–36.5)
MCV RBC AUTO: 97.8 FL (ref 80–96)
NONHDLC SERPL-MCNC: 117 MG/DL
PLATELET # BLD AUTO: 197 10^3/UL (ref 150–450)
POTASSIUM SERPL-SCNC: 3.7 MEQ/L (ref 3.5–5.1)
PROT SERPL-MCNC: 6.8 GM/DL (ref 6.4–8.2)
RBC # BLD AUTO: 4.14 10^6/UL (ref 4–5.4)
SODIUM SERPL-SCNC: 145 MEQ/L (ref 136–145)
TRIGL SERPL-MCNC: 121 MG/DL (ref ?–150)
WBC # BLD AUTO: 5.6 10^3/UL (ref 4–10)

## 2021-09-01 ENCOUNTER — HOSPITAL ENCOUNTER (OUTPATIENT)
Dept: HOSPITAL 53 - M SFHCPLAZ | Age: 86
End: 2021-09-01
Attending: PHYSICIAN ASSISTANT
Payer: MEDICARE

## 2021-09-01 ENCOUNTER — HOSPITAL ENCOUNTER (OUTPATIENT)
Dept: HOSPITAL 53 - M WHC | Age: 86
End: 2021-09-01
Attending: PHYSICIAN ASSISTANT
Payer: MEDICARE

## 2021-09-01 DIAGNOSIS — M71.22: ICD-10-CM
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
## 2021-09-01 DIAGNOSIS — S81.802A: ICD-10-CM

## 2021-09-01 DIAGNOSIS — M79.89: Primary | ICD-10-CM

## 2021-09-01 DIAGNOSIS — S81.802A: Primary | ICD-10-CM

## 2021-09-01 PROCEDURE — 87070 CULTURE OTHR SPECIMN AEROBIC: CPT

## 2021-09-01 PROCEDURE — 87205 SMEAR GRAM STAIN: CPT

## 2021-09-01 NOTE — REP
INDICATION:

SWELLING OF LEFT LOWER EXTREMITY, NON HEALING WOUND, OF LEFT.



COMPARISON:

None.



TECHNIQUE:

Left {lower extremity duplex venous scanning is performed from the groin to the ankle

level.



FINDINGS:

The deep veins are anechoic and fully compressible from the groin to the popliteal

fossa in the left lower extremity.  Color flow imaging is homogeneous.  Spectral

Doppler interrogation demonstrates intact respiratory variation in flow and normal

manual augmentation of flow.  There is no evidence of deep vein thrombosis above the

knee.



There is no evidence of DVT in the visualized calf veins.



There is a small Baker's cyst in the left posterior popliteal soft tissues.



IMPRESSION:

No evidence of DVT in the left lower extremity femoropopliteal veins.  No DVT in the

visible portions of the calf veins.



Small left-sided Baker's cyst.





<Electronically signed by Carrington Guardado > 09/01/21 1628

## 2021-09-17 ENCOUNTER — HOSPITAL ENCOUNTER (OUTPATIENT)
Dept: HOSPITAL 53 - M WHC | Age: 86
End: 2021-09-17
Attending: INTERNAL MEDICINE
Payer: MEDICARE

## 2021-09-17 DIAGNOSIS — Z12.31: Primary | ICD-10-CM

## 2021-10-15 ENCOUNTER — HOSPITAL ENCOUNTER (OUTPATIENT)
Dept: HOSPITAL 53 - M PLALAB | Age: 86
End: 2021-10-15
Attending: INTERNAL MEDICINE
Payer: MEDICARE

## 2021-10-15 DIAGNOSIS — M15.4: ICD-10-CM

## 2021-10-15 DIAGNOSIS — I10: Primary | ICD-10-CM

## 2021-10-15 LAB
ALBUMIN SERPL BCG-MCNC: 3.4 GM/DL (ref 3.2–5.2)
ALT SERPL W P-5'-P-CCNC: 30 U/L (ref 12–78)
BILIRUB SERPL-MCNC: 0.9 MG/DL (ref 0.2–1)
BUN SERPL-MCNC: 9 MG/DL (ref 7–18)
CALCIUM SERPL-MCNC: 8.8 MG/DL (ref 8.8–10.2)
CHLORIDE SERPL-SCNC: 108 MEQ/L (ref 98–107)
CO2 SERPL-SCNC: 30 MEQ/L (ref 21–32)
CREAT SERPL-MCNC: 0.86 MG/DL (ref 0.55–1.3)
CRP SERPL-MCNC: 0.47 MG/DL (ref 0–0.3)
GFR SERPL CREATININE-BSD FRML MDRD: > 60 ML/MIN/{1.73_M2} (ref 32–?)
GLUCOSE SERPL-MCNC: 89 MG/DL (ref 70–100)
MAGNESIUM SERPL-MCNC: 1.7 MG/DL (ref 1.8–2.4)
POTASSIUM SERPL-SCNC: 3.4 MEQ/L (ref 3.5–5.1)
PROT SERPL-MCNC: 6.6 GM/DL (ref 6.4–8.2)
SODIUM SERPL-SCNC: 143 MEQ/L (ref 136–145)

## 2022-10-10 ENCOUNTER — HOSPITAL ENCOUNTER (OUTPATIENT)
Dept: HOSPITAL 53 - M PLALAB | Age: 87
End: 2022-10-10
Payer: MEDICARE

## 2022-10-10 DIAGNOSIS — E78.00: ICD-10-CM

## 2022-10-10 DIAGNOSIS — I10: Primary | ICD-10-CM

## 2022-10-10 DIAGNOSIS — E55.9: ICD-10-CM

## 2022-10-10 DIAGNOSIS — K21.9: ICD-10-CM

## 2022-10-10 DIAGNOSIS — M15.4: ICD-10-CM

## 2022-10-10 DIAGNOSIS — Z79.899: ICD-10-CM

## 2022-10-10 LAB
25(OH)D3 SERPL-MCNC: 54.1 NG/ML (ref 30–100)
ALBUMIN SERPL BCG-MCNC: 3.6 GM/DL (ref 3.2–5.2)
ALT SERPL W P-5'-P-CCNC: 28 U/L (ref 12–78)
BASOPHILS # BLD AUTO: 0 10^3/UL (ref 0–0.2)
BASOPHILS NFR BLD AUTO: 0.9 % (ref 0–1)
BILIRUB SERPL-MCNC: 0.6 MG/DL (ref 0.2–1)
BUN SERPL-MCNC: 18 MG/DL (ref 7–18)
CALCIUM SERPL-MCNC: 9.2 MG/DL (ref 8.8–10.2)
CHLORIDE SERPL-SCNC: 109 MEQ/L (ref 98–107)
CHOLEST SERPL-MCNC: 150 MG/DL (ref ?–200)
CHOLEST/HDLC SERPL: 2.05 {RATIO} (ref ?–5)
CO2 SERPL-SCNC: 30 MEQ/L (ref 21–32)
CREAT SERPL-MCNC: 0.9 MG/DL (ref 0.55–1.3)
CRP SERPL-MCNC: 0.3 MG/DL (ref 0–0.3)
EOSINOPHIL # BLD AUTO: 0.2 10^3/UL (ref 0–0.5)
EOSINOPHIL NFR BLD AUTO: 4.7 % (ref 0–3)
ERYTHROCYTE [SEDIMENTATION RATE] IN BLOOD BY WESTERGREN METHOD: 15 MM/HR (ref 0–30)
GFR SERPL CREATININE-BSD FRML MDRD: > 60 ML/MIN/{1.73_M2} (ref 32–?)
GLUCOSE SERPL-MCNC: 92 MG/DL (ref 70–100)
HCT VFR BLD AUTO: 41.6 % (ref 36–47)
HDLC SERPL-MCNC: 73 MG/DL (ref 40–?)
HGB BLD-MCNC: 14 G/DL (ref 12–15.5)
LDLC SERPL CALC-MCNC: 63 MG/DL (ref ?–100)
LYMPHOCYTES # BLD AUTO: 1.7 10^3/UL (ref 1.5–5)
LYMPHOCYTES NFR BLD AUTO: 38.9 % (ref 24–44)
MAGNESIUM SERPL-MCNC: 2.4 MG/DL (ref 1.8–2.4)
MCH RBC QN AUTO: 33.7 PG (ref 27–33)
MCHC RBC AUTO-ENTMCNC: 33.7 G/DL (ref 32–36.5)
MCV RBC AUTO: 100.2 FL (ref 80–96)
MONOCYTES # BLD AUTO: 0.5 10^3/UL (ref 0–0.8)
MONOCYTES NFR BLD AUTO: 10.5 % (ref 2–8)
NEUTROPHILS # BLD AUTO: 2 10^3/UL (ref 1.5–8.5)
NEUTROPHILS NFR BLD AUTO: 44.8 % (ref 36–66)
NONHDLC SERPL-MCNC: 77 MG/DL
PLATELET # BLD AUTO: 191 10^3/UL (ref 150–450)
POTASSIUM SERPL-SCNC: 4.1 MEQ/L (ref 3.5–5.1)
PROT SERPL-MCNC: 6.5 GM/DL (ref 6.4–8.2)
RBC # BLD AUTO: 4.15 10^6/UL (ref 4–5.4)
SODIUM SERPL-SCNC: 142 MEQ/L (ref 136–145)
TRIGL SERPL-MCNC: 71 MG/DL (ref ?–150)
WBC # BLD AUTO: 4.5 10^3/UL (ref 4–10)

## 2022-10-12 ENCOUNTER — HOSPITAL ENCOUNTER (OUTPATIENT)
Dept: HOSPITAL 53 - M PLAIMG | Age: 87
End: 2022-10-12
Payer: MEDICARE

## 2022-10-12 DIAGNOSIS — J98.11: ICD-10-CM

## 2022-10-12 DIAGNOSIS — R06.09: Primary | ICD-10-CM

## 2023-05-01 ENCOUNTER — HOSPITAL ENCOUNTER (OUTPATIENT)
Dept: HOSPITAL 53 - M PLALAB | Age: 88
End: 2023-05-01
Payer: MEDICARE

## 2023-05-01 DIAGNOSIS — E55.9: ICD-10-CM

## 2023-05-01 DIAGNOSIS — Z79.899: ICD-10-CM

## 2023-05-01 DIAGNOSIS — E78.00: Primary | ICD-10-CM

## 2023-05-01 LAB
25(OH)D3 SERPL-MCNC: 37.4 NG/ML (ref 20–100)
ALBUMIN SERPL BCG-MCNC: 3.4 G/DL (ref 3.2–5.2)
ALP SERPL-CCNC: 78 U/L (ref 46–116)
ALT SERPL W P-5'-P-CCNC: 12 U/L (ref 7–40)
AST SERPL-CCNC: 25 U/L (ref ?–34)
BILIRUB SERPL-MCNC: 0.8 MG/DL (ref 0.3–1.2)
BUN SERPL-MCNC: 15 MG/DL (ref 9–23)
CALCIUM SERPL-MCNC: 9 MG/DL (ref 8.3–10.6)
CHLORIDE SERPL-SCNC: 109 MMOL/L (ref 98–107)
CHOLEST SERPL-MCNC: 151 MG/DL (ref ?–200)
CHOLEST/HDLC SERPL: 2.2 {RATIO} (ref ?–5)
CO2 SERPL-SCNC: 29 MMOL/L (ref 20–31)
CREAT SERPL-MCNC: 0.79 MG/DL (ref 0.55–1.3)
CRP SERPL-MCNC: < 0.4 MG/DL (ref ?–1)
EST. AVERAGE GLUCOSE BLD GHB EST-MCNC: 105 MG/DL (ref 60–110)
GFR SERPL CREATININE-BSD FRML MDRD: > 60 ML/MIN/{1.73_M2} (ref 32–?)
GLUCOSE SERPL-MCNC: 88 MG/DL (ref 74–106)
HCT VFR BLD AUTO: 42.2 % (ref 36–47)
HDLC SERPL-MCNC: 68.5 MG/DL (ref 40–?)
HGB BLD-MCNC: 13.7 G/DL (ref 12–15.5)
LDLC SERPL CALC-MCNC: 62.5 MG/DL (ref ?–100)
MCH RBC QN AUTO: 32.9 PG (ref 27–33)
MCHC RBC AUTO-ENTMCNC: 32.5 G/DL (ref 32–36.5)
MCV RBC AUTO: 101.2 FL (ref 80–96)
NONHDLC SERPL-MCNC: 82.5 MG/DL
PLATELET # BLD AUTO: 198 10^3/UL (ref 150–450)
POTASSIUM SERPL-SCNC: 4 MMOL/L (ref 3.5–5.1)
PROT SERPL-MCNC: 6.2 G/DL (ref 5.7–8.2)
RBC # BLD AUTO: 4.17 10^6/UL (ref 4–5.4)
SODIUM SERPL-SCNC: 143 MMOL/L (ref 136–145)
TRIGL SERPL-MCNC: 100 MG/DL (ref ?–150)
TSH SERPL DL<=0.005 MIU/L-ACNC: 0.69 UIU/ML (ref 0.55–4.78)
VIT B12 SERPL-MCNC: 333 PG/ML (ref 211–911)
WBC # BLD AUTO: 5.1 10^3/UL (ref 4–10)